# Patient Record
Sex: FEMALE | Race: WHITE | URBAN - METROPOLITAN AREA
[De-identification: names, ages, dates, MRNs, and addresses within clinical notes are randomized per-mention and may not be internally consistent; named-entity substitution may affect disease eponyms.]

---

## 2017-01-24 ENCOUNTER — OUTPATIENT (OUTPATIENT)
Dept: OUTPATIENT SERVICES | Facility: HOSPITAL | Age: 72
LOS: 1 days | Discharge: HOME | End: 2017-01-24

## 2017-06-27 DIAGNOSIS — D50.9 IRON DEFICIENCY ANEMIA, UNSPECIFIED: ICD-10-CM

## 2017-06-27 DIAGNOSIS — I10 ESSENTIAL (PRIMARY) HYPERTENSION: ICD-10-CM

## 2018-02-26 ENCOUNTER — OUTPATIENT (OUTPATIENT)
Dept: OUTPATIENT SERVICES | Facility: HOSPITAL | Age: 73
LOS: 1 days | Discharge: HOME | End: 2018-02-26

## 2018-02-26 VITALS
HEIGHT: 59 IN | OXYGEN SATURATION: 97 % | HEART RATE: 94 BPM | DIASTOLIC BLOOD PRESSURE: 81 MMHG | WEIGHT: 175.05 LBS | SYSTOLIC BLOOD PRESSURE: 138 MMHG | TEMPERATURE: 97 F | RESPIRATION RATE: 20 BRPM

## 2018-02-26 DIAGNOSIS — Z01.818 ENCOUNTER FOR OTHER PREPROCEDURAL EXAMINATION: ICD-10-CM

## 2018-02-26 DIAGNOSIS — Z98.890 OTHER SPECIFIED POSTPROCEDURAL STATES: Chronic | ICD-10-CM

## 2018-02-26 DIAGNOSIS — M17.12 UNILATERAL PRIMARY OSTEOARTHRITIS, LEFT KNEE: ICD-10-CM

## 2018-02-26 DIAGNOSIS — Z96.652 PRESENCE OF LEFT ARTIFICIAL KNEE JOINT: Chronic | ICD-10-CM

## 2018-02-26 LAB
ALBUMIN SERPL ELPH-MCNC: 4.4 G/DL — SIGNIFICANT CHANGE UP (ref 3–5.5)
ALP SERPL-CCNC: 78 U/L — SIGNIFICANT CHANGE UP (ref 30–115)
ALT FLD-CCNC: 21 U/L — SIGNIFICANT CHANGE UP (ref 0–41)
ANION GAP SERPL CALC-SCNC: 7 MMOL/L — SIGNIFICANT CHANGE UP (ref 7–14)
APPEARANCE UR: CLEAR — SIGNIFICANT CHANGE UP
APTT BLD: 31.1 SEC — SIGNIFICANT CHANGE UP (ref 27–39.2)
AST SERPL-CCNC: 27 U/L — SIGNIFICANT CHANGE UP (ref 0–41)
BASOPHILS # BLD AUTO: 0.07 K/UL — SIGNIFICANT CHANGE UP (ref 0–0.2)
BASOPHILS NFR BLD AUTO: 0.8 % — SIGNIFICANT CHANGE UP (ref 0–1)
BILIRUB SERPL-MCNC: 1.2 MG/DL — SIGNIFICANT CHANGE UP (ref 0.2–1.2)
BILIRUB UR-MCNC: NEGATIVE — SIGNIFICANT CHANGE UP
BLD GP AB SCN SERPL QL: SIGNIFICANT CHANGE UP
BUN SERPL-MCNC: 26 MG/DL — HIGH (ref 10–20)
CALCIUM SERPL-MCNC: 10 MG/DL — SIGNIFICANT CHANGE UP (ref 8.5–10.1)
CHLORIDE SERPL-SCNC: 106 MMOL/L — SIGNIFICANT CHANGE UP (ref 98–110)
CO2 SERPL-SCNC: 29 MMOL/L — SIGNIFICANT CHANGE UP (ref 17–32)
COLOR SPEC: YELLOW — SIGNIFICANT CHANGE UP
CREAT SERPL-MCNC: 1 MG/DL — SIGNIFICANT CHANGE UP (ref 0.7–1.5)
DIFF PNL FLD: NEGATIVE — SIGNIFICANT CHANGE UP
EOSINOPHIL # BLD AUTO: 0.07 K/UL — SIGNIFICANT CHANGE UP (ref 0–0.7)
EOSINOPHIL NFR BLD AUTO: 0.8 % — SIGNIFICANT CHANGE UP (ref 0–8)
GLUCOSE SERPL-MCNC: 88 MG/DL — SIGNIFICANT CHANGE UP (ref 70–110)
GLUCOSE UR QL: NEGATIVE MG/DL — SIGNIFICANT CHANGE UP
HCT VFR BLD CALC: 42.1 % — SIGNIFICANT CHANGE UP (ref 37–47)
HGB BLD-MCNC: 14.4 G/DL — SIGNIFICANT CHANGE UP (ref 14–18)
IMM GRANULOCYTES NFR BLD AUTO: 0.5 % — HIGH (ref 0.1–0.3)
INR BLD: 1.04 RATIO — SIGNIFICANT CHANGE UP (ref 0.65–1.3)
KETONES UR-MCNC: NEGATIVE — SIGNIFICANT CHANGE UP
LEUKOCYTE ESTERASE UR-ACNC: NEGATIVE — SIGNIFICANT CHANGE UP
LYMPHOCYTES # BLD AUTO: 2.24 K/UL — SIGNIFICANT CHANGE UP (ref 1.2–3.4)
LYMPHOCYTES # BLD AUTO: 27.1 % — SIGNIFICANT CHANGE UP (ref 20.5–51.1)
MCHC RBC-ENTMCNC: 29.7 PG — SIGNIFICANT CHANGE UP (ref 27–31)
MCHC RBC-ENTMCNC: 34.2 G/DL — SIGNIFICANT CHANGE UP (ref 32–37)
MCV RBC AUTO: 86.8 FL — SIGNIFICANT CHANGE UP (ref 81–91)
MONOCYTES # BLD AUTO: 0.55 K/UL — SIGNIFICANT CHANGE UP (ref 0.1–0.6)
MONOCYTES NFR BLD AUTO: 6.7 % — SIGNIFICANT CHANGE UP (ref 1.7–9.3)
NEUTROPHILS # BLD AUTO: 5.29 K/UL — SIGNIFICANT CHANGE UP (ref 1.4–6.5)
NEUTROPHILS NFR BLD AUTO: 64.1 % — SIGNIFICANT CHANGE UP (ref 42.2–75.2)
NITRITE UR-MCNC: NEGATIVE — SIGNIFICANT CHANGE UP
NRBC # BLD: 0 /100 WBCS — SIGNIFICANT CHANGE UP (ref 0–0)
PH UR: 5.5 — SIGNIFICANT CHANGE UP (ref 5–8)
PLATELET # BLD AUTO: 192 K/UL — SIGNIFICANT CHANGE UP (ref 130–400)
POTASSIUM SERPL-MCNC: 3.7 MMOL/L — SIGNIFICANT CHANGE UP (ref 3.5–5)
POTASSIUM SERPL-SCNC: 3.7 MMOL/L — SIGNIFICANT CHANGE UP (ref 3.5–5)
PROT SERPL-MCNC: 6.7 G/DL — SIGNIFICANT CHANGE UP (ref 6–8)
PROT UR-MCNC: NEGATIVE MG/DL — SIGNIFICANT CHANGE UP
PROTHROM AB SERPL-ACNC: 11.2 SEC — SIGNIFICANT CHANGE UP (ref 9.95–12.87)
RBC # BLD: 4.85 M/UL — SIGNIFICANT CHANGE UP (ref 4.2–5.4)
RBC # FLD: 12.2 % — SIGNIFICANT CHANGE UP (ref 11.5–14.5)
SODIUM SERPL-SCNC: 142 MMOL/L — SIGNIFICANT CHANGE UP (ref 135–146)
SP GR SPEC: 1.02 — SIGNIFICANT CHANGE UP (ref 1.01–1.03)
TYPE + AB SCN PNL BLD: SIGNIFICANT CHANGE UP
UROBILINOGEN FLD QL: 0.2 MG/DL — SIGNIFICANT CHANGE UP (ref 0.2–0.2)
WBC # BLD: 8.26 K/UL — SIGNIFICANT CHANGE UP (ref 4.8–10.8)
WBC # FLD AUTO: 8.26 K/UL — SIGNIFICANT CHANGE UP (ref 4.8–10.8)

## 2018-02-26 NOTE — H&P PST ADULT - PMH
HTN (hypertension)    Hypercholesteremia Arthritis  oa  HTN (hypertension)    Hypercholesteremia Arthritis  oa  Cancer, colon    HTN (hypertension)    Hypercholesteremia

## 2018-02-26 NOTE — H&P PST ADULT - GASTROINTESTINAL DETAILS
no masses palpable/bowel sounds normal/no distention/soft/obese with well healed  abd scars/normal/no rebound tenderness

## 2018-02-26 NOTE — H&P PST ADULT - REASON FOR ADMISSION
pt for right total knee replacement  pt with degenerative changes  pt denies current cp,sob,palp,cough,dysuria   cannot assess ex daxa. pt ambulates slowly and carefully with a cane  pt states this is complete med/surg history

## 2018-02-27 LAB — MRSA PCR RESULT.: NEGATIVE — SIGNIFICANT CHANGE UP

## 2018-02-28 LAB
CULTURE RESULTS: NO GROWTH — SIGNIFICANT CHANGE UP
SPECIMEN SOURCE: SIGNIFICANT CHANGE UP

## 2018-03-12 ENCOUNTER — RESULT REVIEW (OUTPATIENT)
Age: 73
End: 2018-03-12

## 2018-03-12 ENCOUNTER — INPATIENT (INPATIENT)
Facility: HOSPITAL | Age: 73
LOS: 1 days | Discharge: HOME HEALTH | End: 2018-03-14
Attending: ORTHOPAEDIC SURGERY

## 2018-03-12 VITALS
SYSTOLIC BLOOD PRESSURE: 122 MMHG | HEIGHT: 59 IN | HEART RATE: 72 BPM | TEMPERATURE: 98 F | DIASTOLIC BLOOD PRESSURE: 87 MMHG | RESPIRATION RATE: 18 BRPM | WEIGHT: 175.05 LBS

## 2018-03-12 DIAGNOSIS — Z96.652 PRESENCE OF LEFT ARTIFICIAL KNEE JOINT: Chronic | ICD-10-CM

## 2018-03-12 DIAGNOSIS — Z98.890 OTHER SPECIFIED POSTPROCEDURAL STATES: Chronic | ICD-10-CM

## 2018-03-12 LAB — ABO RH CONFIRMATION: SIGNIFICANT CHANGE UP

## 2018-03-12 RX ORDER — METOCLOPRAMIDE HCL 10 MG
10 TABLET ORAL EVERY 6 HOURS
Qty: 0 | Refills: 0 | Status: DISCONTINUED | OUTPATIENT
Start: 2018-03-12 | End: 2018-03-14

## 2018-03-12 RX ORDER — ENOXAPARIN SODIUM 100 MG/ML
40 INJECTION SUBCUTANEOUS ONCE
Qty: 0 | Refills: 0 | Status: COMPLETED | OUTPATIENT
Start: 2018-03-13 | End: 2018-03-13

## 2018-03-12 RX ORDER — ATORVASTATIN CALCIUM 80 MG/1
20 TABLET, FILM COATED ORAL AT BEDTIME
Qty: 0 | Refills: 0 | Status: DISCONTINUED | OUTPATIENT
Start: 2018-03-12 | End: 2018-03-14

## 2018-03-12 RX ORDER — ACETAMINOPHEN 500 MG
650 TABLET ORAL EVERY 6 HOURS
Qty: 0 | Refills: 0 | Status: DISCONTINUED | OUTPATIENT
Start: 2018-03-12 | End: 2018-03-14

## 2018-03-12 RX ORDER — HYDROCHLOROTHIAZIDE 25 MG
12.5 TABLET ORAL DAILY
Qty: 0 | Refills: 0 | Status: DISCONTINUED | OUTPATIENT
Start: 2018-03-13 | End: 2018-03-14

## 2018-03-12 RX ORDER — FERROUS SULFATE 325(65) MG
325 TABLET ORAL
Qty: 0 | Refills: 0 | Status: DISCONTINUED | OUTPATIENT
Start: 2018-03-12 | End: 2018-03-14

## 2018-03-12 RX ORDER — ASPIRIN/CALCIUM CARB/MAGNESIUM 324 MG
325 TABLET ORAL EVERY 12 HOURS
Qty: 0 | Refills: 0 | Status: DISCONTINUED | OUTPATIENT
Start: 2018-03-12 | End: 2018-03-14

## 2018-03-12 RX ORDER — OXYCODONE HYDROCHLORIDE 5 MG/1
5 TABLET ORAL EVERY 4 HOURS
Qty: 0 | Refills: 0 | Status: DISCONTINUED | OUTPATIENT
Start: 2018-03-12 | End: 2018-03-14

## 2018-03-12 RX ORDER — DOCUSATE SODIUM 100 MG
100 CAPSULE ORAL THREE TIMES A DAY
Qty: 0 | Refills: 0 | Status: DISCONTINUED | OUTPATIENT
Start: 2018-03-12 | End: 2018-03-14

## 2018-03-12 RX ORDER — SODIUM CHLORIDE 9 MG/ML
1000 INJECTION, SOLUTION INTRAVENOUS
Qty: 0 | Refills: 0 | Status: DISCONTINUED | OUTPATIENT
Start: 2018-03-12 | End: 2018-03-12

## 2018-03-12 RX ORDER — MORPHINE SULFATE 50 MG/1
4 CAPSULE, EXTENDED RELEASE ORAL
Qty: 0 | Refills: 0 | Status: DISCONTINUED | OUTPATIENT
Start: 2018-03-12 | End: 2018-03-12

## 2018-03-12 RX ORDER — SENNA PLUS 8.6 MG/1
1 TABLET ORAL
Qty: 0 | Refills: 0 | Status: DISCONTINUED | OUTPATIENT
Start: 2018-03-12 | End: 2018-03-14

## 2018-03-12 RX ORDER — HYDROMORPHONE HYDROCHLORIDE 2 MG/ML
1 INJECTION INTRAMUSCULAR; INTRAVENOUS; SUBCUTANEOUS
Qty: 0 | Refills: 0 | Status: DISCONTINUED | OUTPATIENT
Start: 2018-03-12 | End: 2018-03-12

## 2018-03-12 RX ORDER — OXYCODONE HYDROCHLORIDE 5 MG/1
10 TABLET ORAL EVERY 4 HOURS
Qty: 0 | Refills: 0 | Status: DISCONTINUED | OUTPATIENT
Start: 2018-03-12 | End: 2018-03-14

## 2018-03-12 RX ORDER — SODIUM CHLORIDE 9 MG/ML
1000 INJECTION INTRAMUSCULAR; INTRAVENOUS; SUBCUTANEOUS
Qty: 0 | Refills: 0 | Status: DISCONTINUED | OUTPATIENT
Start: 2018-03-12 | End: 2018-03-13

## 2018-03-12 RX ORDER — VALSARTAN 80 MG/1
80 TABLET ORAL DAILY
Qty: 0 | Refills: 0 | Status: DISCONTINUED | OUTPATIENT
Start: 2018-03-13 | End: 2018-03-14

## 2018-03-12 RX ADMIN — ATORVASTATIN CALCIUM 20 MILLIGRAM(S): 80 TABLET, FILM COATED ORAL at 21:27

## 2018-03-12 RX ADMIN — SENNA PLUS 1 TABLET(S): 8.6 TABLET ORAL at 17:47

## 2018-03-12 RX ADMIN — MORPHINE SULFATE 4 MILLIGRAM(S): 50 CAPSULE, EXTENDED RELEASE ORAL at 13:49

## 2018-03-12 RX ADMIN — MORPHINE SULFATE 4 MILLIGRAM(S): 50 CAPSULE, EXTENDED RELEASE ORAL at 14:49

## 2018-03-12 RX ADMIN — MORPHINE SULFATE 4 MILLIGRAM(S): 50 CAPSULE, EXTENDED RELEASE ORAL at 14:00

## 2018-03-12 RX ADMIN — Medication 650 MILLIGRAM(S): at 17:47

## 2018-03-12 RX ADMIN — MORPHINE SULFATE 4 MILLIGRAM(S): 50 CAPSULE, EXTENDED RELEASE ORAL at 15:00

## 2018-03-12 RX ADMIN — Medication 325 MILLIGRAM(S): at 17:47

## 2018-03-12 RX ADMIN — Medication 100 MILLIGRAM(S): at 19:08

## 2018-03-12 RX ADMIN — MORPHINE SULFATE 4 MILLIGRAM(S): 50 CAPSULE, EXTENDED RELEASE ORAL at 14:45

## 2018-03-12 RX ADMIN — SODIUM CHLORIDE 50 MILLILITER(S): 9 INJECTION INTRAMUSCULAR; INTRAVENOUS; SUBCUTANEOUS at 17:38

## 2018-03-12 RX ADMIN — Medication 650 MILLIGRAM(S): at 17:46

## 2018-03-12 RX ADMIN — MORPHINE SULFATE 4 MILLIGRAM(S): 50 CAPSULE, EXTENDED RELEASE ORAL at 15:41

## 2018-03-12 RX ADMIN — MORPHINE SULFATE 4 MILLIGRAM(S): 50 CAPSULE, EXTENDED RELEASE ORAL at 16:02

## 2018-03-12 RX ADMIN — Medication 650 MILLIGRAM(S): at 23:55

## 2018-03-12 RX ADMIN — Medication 100 MILLIGRAM(S): at 21:27

## 2018-03-12 NOTE — PRE-ANESTHESIA EVALUATION ADULT - NSANTHOSAYNRD_GEN_A_CORE
see manoj tool/No. MANOJ screening performed.  STOP BANG Legend: 0-2 = LOW Risk; 3-4 = INTERMEDIATE Risk; 5-8 = HIGH Risk

## 2018-03-12 NOTE — ASU PATIENT PROFILE, ADULT - PSH
H/O umbilical hernia repair  2001  History of colon resection  2002  History of incisional hernia repair  2003  History of knee replacement, total, left  1-2017  History of ventral hernia repair  per pt my hernia became entangled in mesh  2-2017

## 2018-03-12 NOTE — PROGRESS NOTE ADULT - SUBJECTIVE AND OBJECTIVE BOX
TKA ORTHOPEDIC POST OP CHECK    T(C): 36 (03-12-18 @ 17:00), Max: 36.8 (03-12-18 @ 09:15)  HR: 62 (03-12-18 @ 17:00) (62 - 80)  BP: 115/57 (03-12-18 @ 17:00) (105/67 - 134/83)  RR: 19 (03-12-18 @ 17:00) (16 - 23)  SpO2: 97% (03-12-18 @ 16:07) (97% - 99%)      preop h/h/ 14/42      S/P TKA ARTHROPLASTY  PAIN CONTROLLED  VSS   A&O X3  DRESSING C/D/I  NVI  ANTIBIOTICS INFUSED  DVT PROPHYLAXIS ORDERED  ENCOURAGE INCENTIVE SPIROMETRY  AM LABS  REHAB TO BEGIN IN THE AM WBAT  D/C PLANNING

## 2018-03-12 NOTE — BRIEF OPERATIVE NOTE - PROCEDURE
<<-----Click on this checkbox to enter Procedure Right total knee replacement  03/12/2018    Active  ACE

## 2018-03-12 NOTE — BRIEF OPERATIVE NOTE - OPERATION/FINDINGS
degenerative changes, preoperative flexion contracture 20 degrees, knee flexion only to 60 degress, full motion post op

## 2018-03-13 ENCOUNTER — TRANSCRIPTION ENCOUNTER (OUTPATIENT)
Age: 73
End: 2018-03-13

## 2018-03-13 LAB
ANION GAP SERPL CALC-SCNC: 7 MMOL/L — SIGNIFICANT CHANGE UP (ref 7–14)
BASOPHILS # BLD AUTO: 0.02 K/UL — SIGNIFICANT CHANGE UP (ref 0–0.2)
BASOPHILS NFR BLD AUTO: 0.2 % — SIGNIFICANT CHANGE UP (ref 0–1)
BUN SERPL-MCNC: 32 MG/DL — HIGH (ref 10–20)
CALCIUM SERPL-MCNC: 8.6 MG/DL — SIGNIFICANT CHANGE UP (ref 8.5–10.1)
CHLORIDE SERPL-SCNC: 99 MMOL/L — SIGNIFICANT CHANGE UP (ref 98–110)
CO2 SERPL-SCNC: 27 MMOL/L — SIGNIFICANT CHANGE UP (ref 17–32)
CREAT SERPL-MCNC: 1 MG/DL — SIGNIFICANT CHANGE UP (ref 0.7–1.5)
EOSINOPHIL # BLD AUTO: 0 K/UL — SIGNIFICANT CHANGE UP (ref 0–0.7)
EOSINOPHIL NFR BLD AUTO: 0 % — SIGNIFICANT CHANGE UP (ref 0–8)
GLUCOSE SERPL-MCNC: 115 MG/DL — HIGH (ref 70–110)
HCT VFR BLD CALC: 35 % — LOW (ref 37–47)
HGB BLD-MCNC: 11.8 G/DL — LOW (ref 12–16)
IMM GRANULOCYTES NFR BLD AUTO: 0.5 % — HIGH (ref 0.1–0.3)
LYMPHOCYTES # BLD AUTO: 1.29 K/UL — SIGNIFICANT CHANGE UP (ref 1.2–3.4)
LYMPHOCYTES # BLD AUTO: 14.6 % — LOW (ref 20.5–51.1)
MCHC RBC-ENTMCNC: 29.8 PG — SIGNIFICANT CHANGE UP (ref 27–31)
MCHC RBC-ENTMCNC: 33.7 G/DL — SIGNIFICANT CHANGE UP (ref 32–37)
MCV RBC AUTO: 88.4 FL — SIGNIFICANT CHANGE UP (ref 81–99)
MONOCYTES # BLD AUTO: 0.97 K/UL — HIGH (ref 0.1–0.6)
MONOCYTES NFR BLD AUTO: 11 % — HIGH (ref 1.7–9.3)
NEUTROPHILS # BLD AUTO: 6.5 K/UL — SIGNIFICANT CHANGE UP (ref 1.4–6.5)
NEUTROPHILS NFR BLD AUTO: 73.7 % — SIGNIFICANT CHANGE UP (ref 42.2–75.2)
NRBC # BLD: 0 /100 WBCS — SIGNIFICANT CHANGE UP (ref 0–0)
PLATELET # BLD AUTO: 152 K/UL — SIGNIFICANT CHANGE UP (ref 130–400)
POTASSIUM SERPL-MCNC: 4.4 MMOL/L — SIGNIFICANT CHANGE UP (ref 3.5–5)
POTASSIUM SERPL-SCNC: 4.4 MMOL/L — SIGNIFICANT CHANGE UP (ref 3.5–5)
RBC # BLD: 3.96 M/UL — LOW (ref 4.2–5.4)
RBC # FLD: 12.4 % — SIGNIFICANT CHANGE UP (ref 11.5–14.5)
SODIUM SERPL-SCNC: 133 MMOL/L — LOW (ref 135–146)
WBC # BLD: 8.82 K/UL — SIGNIFICANT CHANGE UP (ref 4.8–10.8)
WBC # FLD AUTO: 8.82 K/UL — SIGNIFICANT CHANGE UP (ref 4.8–10.8)

## 2018-03-13 RX ADMIN — Medication 1 TABLET(S): at 12:09

## 2018-03-13 RX ADMIN — OXYCODONE HYDROCHLORIDE 5 MILLIGRAM(S): 5 TABLET ORAL at 23:39

## 2018-03-13 RX ADMIN — Medication 100 MILLIGRAM(S): at 13:11

## 2018-03-13 RX ADMIN — VALSARTAN 80 MILLIGRAM(S): 80 TABLET ORAL at 05:51

## 2018-03-13 RX ADMIN — OXYCODONE HYDROCHLORIDE 5 MILLIGRAM(S): 5 TABLET ORAL at 17:29

## 2018-03-13 RX ADMIN — ATORVASTATIN CALCIUM 20 MILLIGRAM(S): 80 TABLET, FILM COATED ORAL at 21:58

## 2018-03-13 RX ADMIN — Medication 325 MILLIGRAM(S): at 05:17

## 2018-03-13 RX ADMIN — Medication 325 MILLIGRAM(S): at 07:57

## 2018-03-13 RX ADMIN — ENOXAPARIN SODIUM 40 MILLIGRAM(S): 100 INJECTION SUBCUTANEOUS at 05:29

## 2018-03-13 RX ADMIN — Medication 325 MILLIGRAM(S): at 12:09

## 2018-03-13 RX ADMIN — Medication 650 MILLIGRAM(S): at 12:11

## 2018-03-13 RX ADMIN — Medication 650 MILLIGRAM(S): at 12:10

## 2018-03-13 RX ADMIN — Medication 650 MILLIGRAM(S): at 17:29

## 2018-03-13 RX ADMIN — Medication 100 MILLIGRAM(S): at 21:58

## 2018-03-13 RX ADMIN — Medication 650 MILLIGRAM(S): at 17:30

## 2018-03-13 RX ADMIN — SENNA PLUS 1 TABLET(S): 8.6 TABLET ORAL at 17:28

## 2018-03-13 RX ADMIN — Medication 650 MILLIGRAM(S): at 05:14

## 2018-03-13 RX ADMIN — Medication 650 MILLIGRAM(S): at 06:47

## 2018-03-13 RX ADMIN — Medication 650 MILLIGRAM(S): at 23:40

## 2018-03-13 RX ADMIN — Medication 100 MILLIGRAM(S): at 02:58

## 2018-03-13 RX ADMIN — Medication 12.5 MILLIGRAM(S): at 05:17

## 2018-03-13 RX ADMIN — OXYCODONE HYDROCHLORIDE 5 MILLIGRAM(S): 5 TABLET ORAL at 17:30

## 2018-03-13 RX ADMIN — Medication 100 MILLIGRAM(S): at 05:18

## 2018-03-13 RX ADMIN — Medication 325 MILLIGRAM(S): at 17:29

## 2018-03-13 RX ADMIN — SENNA PLUS 1 TABLET(S): 8.6 TABLET ORAL at 05:15

## 2018-03-13 NOTE — DISCHARGE NOTE ADULT - PATIENT PORTAL LINK FT
You can access the [facility] Patient Portal, offered by [facility], by registering with the following website: [email or web address]

## 2018-03-13 NOTE — DISCHARGE NOTE ADULT - CARE PROVIDER_API CALL
Valentino Jensen), Orthopaedic Surgery  [facility] [place] [address]  Phone: [phone]  Fax: [phone]

## 2018-03-13 NOTE — DISCHARGE NOTE ADULT - CARE PLAN
Principal Discharge DX:	Arthritis  Goal:	return to  function following knee replacement  Assessment and plan of treatment:	physical therapy s/p Total knee replacement

## 2018-03-13 NOTE — CHART NOTE - NSCHARTNOTEFT_GEN_A_CORE
patient seen and evaluated for post anesthesia follow up  No anesthesia related  complications or events  vitals stable, recovering well

## 2018-03-13 NOTE — PROVIDER CONTACT NOTE (CHANGE IN STATUS NOTIFICATION) - SITUATION
ELIZABETH Burroughs notified of pt. complaining of pain radiating from left side to back. Pt. does not want pain medicine.

## 2018-03-13 NOTE — DISCHARGE NOTE ADULT - MEDICATION SUMMARY - MEDICATIONS TO TAKE
I will START or STAY ON the medications listed below when I get home from the hospital:    aspirin 325 mg oral tablet  -- 1 tab(s) by mouth every 12 hours FOR DVT PROPHYLAXIS   -- Indication: For dvt prophylaxis    oxyCODONE 5 mg oral tablet  -- 1 tab(s) by mouth every 4 hours, As needed, Mild Pain MDD:6  -- Indication: For pain    atorvastatin 20 mg oral tablet  -- 1 tab(s) by mouth once a day (at bedtime)  -- Indication: For home med    Diovan HCT 80 mg-12.5 mg oral tablet  -- 1 tab(s) by mouth once a day  -- Indication: For home med

## 2018-03-13 NOTE — PROGRESS NOTE ADULT - SUBJECTIVE AND OBJECTIVE BOX
pt se this am, pain controlled, denies fc nv dc cp sob calf pain    aox3 nad  r knee  cdi  silt  nvid  digits well perfused    sp r tka  - pain control  - dc drain tomorrow  - out of bed to chair  - ambulation  - pt  - wbat  - dc planning  - dvt ppx

## 2018-03-13 NOTE — CONSULT NOTE ADULT - ASSESSMENT
IMPRESSION: Rehab of R TKR    PRECAUTIONS: [    ] Cardiac  [    ] Respiratory  [    ] Seizures [    ] Contact Isolation  [    ] Droplet Isolation  [    ] Other    Weight Bearing Status:     RECOMMENDATION:    Out of Bed to Chair     DVT/Decubiti Prophylaxis    REHAB PLAN:     [   x  ] Bedside P/T 3-5 times a week   [ x    ] Bedside O/T  2-3 times a week   [     ] No Rehab Therapy Indicated   [     ]  Speech Therapy   Conditioning/ROM                                 ADL  Bed Mobility                                            Conditioning/ROM  Transfers                                                  Bed Mobility  Sitting /Standing Balance                      Transfers                                        Gait Training                                            Sitting/Standing Balance  Stair Training [x   ]Applicable                 Home equipment Eval                                                                     Splinting  [   ] Only      GOALS:   ADL   [  x  ]   Independent         Transfers  [   x ] Independent            Ambulation  [   x  ] Independent     [   x  ] With device                            [    ]  CG                                               [    ]  CG                                                    [     ] CG                            [    ] Min A                                          [    ] Min A                                                [     ] Min  A          DISCHARGE PLAN:   [     ]  Good candidate for Intensive Rehabilitation/Hospital based-4A [facility]                                             Will tolerate 3hrs Intensive Rehab Daily                                       [      ]  Short Term Rehab in Skilled Nursing Facility                                       [ x     ]  Home with Outpatient or  services                                         [      ]  Possible Candidate for Intensive Hospital based Rehab

## 2018-03-13 NOTE — CONSULT NOTE ADULT - SUBJECTIVE AND OBJECTIVE BOX
Patient is a 72y old  Female who presents with a chief complaint of Pain R Knee   HPI: SP R TKR      PAST MEDICAL & SURGICAL HISTORY:  Cancer, colon  Arthritis: oa  Hypercholesteremia  HTN (hypertension)  History of incisional hernia repair: 2003  H/O umbilical hernia repair: 2001  History of colon resection: 2002  History of knee replacement, total, left: 1-2017  History of ventral hernia repair: per pt my hernia became entangled in mesh  2-2017      Hospital Course: UNcomplicated    TODAY'S SUBJECTIVE & REVIEW OF SYMPTOMS:     Constitutional pain   Cardio WNL   Resp WNL   GI WNL  Heme WNL  Endo WNL  Skin WNL  MSK hx LBP  Neuro WNL  Cognitive WNL  Psych WNL      MEDICATIONS  (STANDING):  acetaminophen   Tablet. 650 milliGRAM(s) Oral every 6 hours  aspirin 325 milliGRAM(s) Oral every 12 hours  atorvastatin 20 milliGRAM(s) Oral at bedtime  docusate sodium 100 milliGRAM(s) Oral three times a day  ferrous    sulfate 325 milliGRAM(s) Oral three times a day with meals  hydrochlorothiazide 12.5 milliGRAM(s) Oral daily  multivitamin 1 Tablet(s) Oral daily  senna 1 Tablet(s) Oral two times a day  valsartan 80 milliGRAM(s) Oral daily    MEDICATIONS  (PRN):  metoclopramide Injectable 10 milliGRAM(s) IV Push every 6 hours PRN Nausea and/or Vomiting  oxyCODONE    IR 10 milliGRAM(s) Oral every 4 hours PRN Moderate Pain  oxyCODONE    IR 5 milliGRAM(s) Oral every 4 hours PRN Mild Pain      FAMILY HISTORY:      Allergies    penicillin (Other)    Intolerances        SOCIAL HISTORY:    [    ] Etoh  [    ] Smoking  [    ] Substance abuse     Home Environment:  [    ] Home Alone  [  x  ] Lives with Family  [    ] Home Health Aid    Dwelling:  [    ] Apartment  [ x   ] Private House  [    ] Adult Home  [    ] Skilled Nursing Facility      [    ] Short Term  [    ] Long Term  [  x  ] Stairs                           [    ] Elevator     FUNCTIONAL STATUS PTA: (Check all that apply)  Ambulation: [  x   ]Independent    [    ] Dependent     [    ] Non-Ambulatory  Assistive Device: [  x  ] SA Cane  [    ]  Q Cane  [    ] Walker  [    ]  Wheelchair  ADL : [  x  ] Independent  [    ]  Dependent       Vital Signs Last 24 Hrs  T(C): 36.4 (13 Mar 2018 07:49), Max: 36.6 (12 Mar 2018 14:35)  T(F): 97.5 (13 Mar 2018 07:49), Max: 97.9 (12 Mar 2018 14:35)  HR: 62 (13 Mar 2018 07:49) (54 - 80)  BP: 103/58 (13 Mar 2018 07:49) (100/59 - 134/83)  BP(mean): --  RR: 18 (13 Mar 2018 07:49) (16 - 23)  SpO2: 97% (12 Mar 2018 16:07) (97% - 99%)      PHYSICAL EXAM: Alert & Oriented X3  GENERAL: NAD, well-groomed, well-developed  HEAD:  Atraumatic, Normocephalic  EYES: EOMI, PERRLA, conjunctiva and sclera clear  NECK: Supple, No JVD, Normal thyroid  CHEST/LUNG: Clear to percussion bilaterally; No rales, rhonchi, wheezing, or rubs  HEART: Regular rate and rhythm; No murmurs, rubs, or gallops  ABDOMEN: Soft, Nontender, Nondistended; Bowel sounds present  EXTREMITIES:  2+ Peripheral Pulses, No clubbing, cyanosis, or edema R knne bandaged with drain      NERVOUS SYSTEM:  Cranial Nerves 2-12 intact [ x   ] Abnormal  [    ]  ROM: WFL all extremities except R knee  Motor Strength: WFL all extremities  [ x   ]  Abnormal [    ] +SLR RLE Distally 5/5  Sensation: intact to light touch [  x  ] Abnormal [    ]  Reflexes: Symmetric [    ]  Abnormal [    ]    FUNCTIONAL STATUS:  Bed Mobility: [   ]  Independent [    ]  Supervision [ x   ]  Needs Assistance [  ]  N/A  Transfers: [    ]  Independent [    ]  Supervision [x  ]  Needs Assistance [    ]  N/A   CG with transfers, RW  with PT  Ambulation:  [    ]  Independent [    ]  Supervision [ x   ]  Needs Assistance [    ]  N/A   ADL:  [    ]   Independent [x    ] Requires Assistance [    ] N/A       LABS:                        11.8   8.82  )-----------( 152      ( 13 Mar 2018 08:02 )             35.0     03-13    133<L>  |  99  |  32<H>  ----------------------------<  115<H>  4.4   |  27  |  1.0    Ca    8.6      13 Mar 2018 08:02            RADIOLOGY & ADDITIONAL STUDIES:

## 2018-03-13 NOTE — PROGRESS NOTE ADULT - SUBJECTIVE AND OBJECTIVE BOX
pt s&e  POD  doing well  dressing c/d  leg soft  nvid    s/p TKA pod 1    plan  f/u am labs  pt  dvt proph  IS  OOB

## 2018-03-14 VITALS
HEART RATE: 79 BPM | SYSTOLIC BLOOD PRESSURE: 132 MMHG | TEMPERATURE: 99 F | DIASTOLIC BLOOD PRESSURE: 72 MMHG | RESPIRATION RATE: 18 BRPM

## 2018-03-14 LAB
HCT VFR BLD CALC: 33.1 % — LOW (ref 37–47)
HGB BLD-MCNC: 11.2 G/DL — LOW (ref 12–16)
MCHC RBC-ENTMCNC: 29.9 PG — SIGNIFICANT CHANGE UP (ref 27–31)
MCHC RBC-ENTMCNC: 33.8 G/DL — SIGNIFICANT CHANGE UP (ref 32–37)
MCV RBC AUTO: 88.5 FL — SIGNIFICANT CHANGE UP (ref 81–99)
NRBC # BLD: 0 /100 WBCS — SIGNIFICANT CHANGE UP (ref 0–0)
PLATELET # BLD AUTO: 128 K/UL — LOW (ref 130–400)
RBC # BLD: 3.74 M/UL — LOW (ref 4.2–5.4)
RBC # FLD: 12.7 % — SIGNIFICANT CHANGE UP (ref 11.5–14.5)
WBC # BLD: 7.94 K/UL — SIGNIFICANT CHANGE UP (ref 4.8–10.8)
WBC # FLD AUTO: 7.94 K/UL — SIGNIFICANT CHANGE UP (ref 4.8–10.8)

## 2018-03-14 RX ORDER — OMEGA-3 ACID ETHYL ESTERS 1 G
0 CAPSULE ORAL
Qty: 0 | Refills: 0 | COMMUNITY

## 2018-03-14 RX ORDER — OXYCODONE HYDROCHLORIDE 5 MG/1
1 TABLET ORAL
Qty: 42 | Refills: 0
Start: 2018-03-14 | End: 2018-03-20

## 2018-03-14 RX ORDER — ASPIRIN/CALCIUM CARB/MAGNESIUM 324 MG
1 TABLET ORAL
Qty: 60 | Refills: 0
Start: 2018-03-14 | End: 2018-04-12

## 2018-03-14 RX ADMIN — OXYCODONE HYDROCHLORIDE 5 MILLIGRAM(S): 5 TABLET ORAL at 05:07

## 2018-03-14 RX ADMIN — OXYCODONE HYDROCHLORIDE 5 MILLIGRAM(S): 5 TABLET ORAL at 01:05

## 2018-03-14 RX ADMIN — Medication 100 MILLIGRAM(S): at 05:02

## 2018-03-14 RX ADMIN — SENNA PLUS 1 TABLET(S): 8.6 TABLET ORAL at 05:03

## 2018-03-14 RX ADMIN — OXYCODONE HYDROCHLORIDE 5 MILLIGRAM(S): 5 TABLET ORAL at 13:03

## 2018-03-14 RX ADMIN — VALSARTAN 80 MILLIGRAM(S): 80 TABLET ORAL at 05:04

## 2018-03-14 RX ADMIN — OXYCODONE HYDROCHLORIDE 5 MILLIGRAM(S): 5 TABLET ORAL at 05:30

## 2018-03-14 RX ADMIN — Medication 325 MILLIGRAM(S): at 12:23

## 2018-03-14 RX ADMIN — Medication 12.5 MILLIGRAM(S): at 05:03

## 2018-03-14 RX ADMIN — OXYCODONE HYDROCHLORIDE 5 MILLIGRAM(S): 5 TABLET ORAL at 12:34

## 2018-03-14 RX ADMIN — Medication 650 MILLIGRAM(S): at 05:02

## 2018-03-14 RX ADMIN — Medication 100 MILLIGRAM(S): at 13:06

## 2018-03-14 RX ADMIN — Medication 1 TABLET(S): at 12:23

## 2018-03-14 RX ADMIN — Medication 325 MILLIGRAM(S): at 05:03

## 2018-03-14 RX ADMIN — Medication 325 MILLIGRAM(S): at 09:07

## 2018-03-14 NOTE — PROGRESS NOTE ADULT - SUBJECTIVE AND OBJECTIVE BOX
POD# 2 S/P TKA  T(C): 37.5 (03-13-18 @ 23:44), Max: 37.5 (03-13-18 @ 23:44)  HR: 72 (03-14-18 @ 05:00) (66 - 75)  BP: 125/69 (03-14-18 @ 05:00) (125/69 - 135/76)  RR: 18 (03-14-18 @ 05:00) (18 - 18)  SpO2: 95% (03-14-18 @ 05:00) (95% - 95%)                        11.2   7.94  )-----------( 128      ( 14 Mar 2018 08:02 )             33.1     03-13    133<L>  |  99  |  32<H>  ----------------------------<  115<H>  4.4   |  27  |  1.0    Ca    8.6      13 Mar 2018 08:02        PTS PAIN IS CONTROLLED   WOUND IS CDI DRESSING CHANGED HV REMOVED	  N/V/I  ABX COMPLETE  DVT PROPHYLAXIS IN PLACE  REHAB IN PROGRESS  D/C PLAN PENDING

## 2018-03-14 NOTE — OCCUPATIONAL THERAPY INITIAL EVALUATION ADULT - GENERAL OBSERVATIONS, REHAB EVAL
pt received semi alonzo in bed in NAD, agreeable to Ot evaluation, spouse present at bedside, + R knee ace wrap, left seated at EOB with PCA Mario present

## 2018-03-19 DIAGNOSIS — M17.12 UNILATERAL PRIMARY OSTEOARTHRITIS, LEFT KNEE: ICD-10-CM

## 2018-03-19 DIAGNOSIS — I10 ESSENTIAL (PRIMARY) HYPERTENSION: ICD-10-CM

## 2018-03-19 DIAGNOSIS — E78.00 PURE HYPERCHOLESTEROLEMIA, UNSPECIFIED: ICD-10-CM

## 2018-03-19 DIAGNOSIS — Z96.652 PRESENCE OF LEFT ARTIFICIAL KNEE JOINT: ICD-10-CM

## 2018-03-19 DIAGNOSIS — M17.11 UNILATERAL PRIMARY OSTEOARTHRITIS, RIGHT KNEE: ICD-10-CM

## 2018-03-19 DIAGNOSIS — Z85.038 PERSONAL HISTORY OF OTHER MALIGNANT NEOPLASM OF LARGE INTESTINE: ICD-10-CM

## 2018-03-23 LAB — SURGICAL PATHOLOGY STUDY: SIGNIFICANT CHANGE UP

## 2018-04-10 PROBLEM — I10 ESSENTIAL (PRIMARY) HYPERTENSION: Chronic | Status: ACTIVE | Noted: 2018-02-26

## 2018-04-10 PROBLEM — E78.00 PURE HYPERCHOLESTEROLEMIA, UNSPECIFIED: Chronic | Status: ACTIVE | Noted: 2018-02-26

## 2018-06-19 ENCOUNTER — OUTPATIENT (OUTPATIENT)
Dept: OUTPATIENT SERVICES | Facility: HOSPITAL | Age: 73
LOS: 1 days | Discharge: HOME | End: 2018-06-19

## 2018-06-19 DIAGNOSIS — Z96.631 PRESENCE OF RIGHT ARTIFICIAL WRIST JOINT: ICD-10-CM

## 2018-06-19 DIAGNOSIS — Z98.890 OTHER SPECIFIED POSTPROCEDURAL STATES: Chronic | ICD-10-CM

## 2018-06-19 DIAGNOSIS — Z96.652 PRESENCE OF LEFT ARTIFICIAL KNEE JOINT: Chronic | ICD-10-CM

## 2018-06-19 DIAGNOSIS — M15.0 PRIMARY GENERALIZED (OSTEO)ARTHRITIS: ICD-10-CM

## 2018-08-17 ENCOUNTER — RESULT REVIEW (OUTPATIENT)
Age: 73
End: 2018-08-17

## 2019-08-27 NOTE — ASU PREOP CHECKLIST - DNR CLARIFICATION FORM COMPLETED
Wound Care:   1  Incisional Hernia   WHAT YOU NEED TO KNOW:   An incisional hernia is a bulge through the healed incision of a previous surgery in your abdomen  An incisional hernia is usually caused by weakness in the tissues and muscles of your abdomen  The bulge is usually caused by a part of your intestine, but it may also be tissue or fat pushing through the weakness  DISCHARGE INSTRUCTIONS:   Call 911 for any of the following:   · You have sudden trouble breathing  Seek care immediately if:   · You have severe pain  · You have bloody bowel movements  · You stop having bowel movements or passing gas  · Your abdomen is suddenly very hard  Contact your healthcare provider if:   · You have a fever  · You have nausea and are vomiting  · You are constipated  · Your hernia has returned  · You have a lump, or collection of fluid, under your skin  · You have pain that does not go away, even after you take pain medicine  · You have questions or concerns about your condition or care  Medicines: You may need any of the following:  · NSAIDs , such as ibuprofen, help decrease swelling, pain, and fever  This medicine is available with or without a doctor's order  NSAIDs can cause stomach bleeding or kidney problems in certain people  If you take blood thinner medicine, always ask your healthcare provider if NSAIDs are safe for you  Always read the medicine label and follow directions  · Prescription pain medicine  may be given  Ask your how to take this medicine safely  · Take your medicine as directed  Contact your healthcare provider if you think your medicine is not helping or if you have side effects  Tell him or her if you are allergic to any medicine  Keep a list of the medicines, vitamins, and herbs you take  Include the amounts, and when and why you take them  Bring the list or the pill bottles to follow-up visits   Carry your medicine list with you in case of an emergency  Prevent another incisional hernia:   · Maintain a healthy weight  Ask your healthcare provider how much you should weigh  Ask him to help you create a weight loss plan if you are overweight  Ask your healthcare provider what types of food you should eat  · Do not strain  when you have a bowel movement  Take an over-the-counter bowel movement softener and drink plenty of water  When you cough, hold a pillow against your incision to prevent strain  · Do not smoke  If you smoke, it is never too late to quit  Ask for information if you need help quitting  · Wear your support device as directed  You may need to wear a support device, such as an abdominal binder for up to 2 weeks after surgery  This helps decrease pain and the risk of fluid collecting under your skin  · Return to your usual activities as directed  Do not lift more than 10 pounds or do strenuous activity for up to 6 weeks  Follow up with your healthcare provider as directed:  Write down your questions so you remember to ask them during your visits  © 2017 [address] [facility] Information is for End User's use only and may not be sold, redistributed or otherwise used for commercial purposes  All illustrations and images included in CareNotes® are the copyrighted property of A D A M , Inc  or "[employer]uss  The above information is an  only  It is not intended as medical advice for individual conditions or treatments  Talk to your doctor, nurse or pharmacist before following any medical regimen to see if it is safe and effective for you  -Hydraguard to bilateral sacrum, buttock and heels BID and PRN  2-Elevate heels to offload pressure  3-Ehob cushion in chair when out of bed  4-Moisturize skin daily with skin nourishing cream   5-Turn/reposition q2h or when medically stable for pressure re-distribution on skin    6-Cleanse R breast with soap and water, pat dry, apply calazime BID and PRN    n/a

## 2020-08-04 PROBLEM — C18.9 MALIGNANT NEOPLASM OF COLON, UNSPECIFIED: Chronic | Status: ACTIVE | Noted: 2018-02-26

## 2020-08-04 PROBLEM — M19.90 UNSPECIFIED OSTEOARTHRITIS, UNSPECIFIED SITE: Chronic | Status: ACTIVE | Noted: 2018-02-26

## 2020-08-11 PROBLEM — Z00.00 ENCOUNTER FOR PREVENTIVE HEALTH EXAMINATION: Status: ACTIVE | Noted: 2020-08-11

## 2020-08-26 ENCOUNTER — APPOINTMENT (OUTPATIENT)
Dept: OTOLARYNGOLOGY | Facility: CLINIC | Age: 75
End: 2020-08-26
Payer: MEDICARE

## 2020-08-26 VITALS — WEIGHT: 175 LBS | BODY MASS INDEX: 35.28 KG/M2 | HEIGHT: 59 IN

## 2020-08-26 DIAGNOSIS — E78.5 HYPERLIPIDEMIA, UNSPECIFIED: ICD-10-CM

## 2020-08-26 DIAGNOSIS — J31.0 CHRONIC RHINITIS: ICD-10-CM

## 2020-08-26 DIAGNOSIS — Z82.49 FAMILY HISTORY OF ISCHEMIC HEART DISEASE AND OTHER DISEASES OF THE CIRCULATORY SYSTEM: ICD-10-CM

## 2020-08-26 DIAGNOSIS — Z87.891 PERSONAL HISTORY OF NICOTINE DEPENDENCE: ICD-10-CM

## 2020-08-26 DIAGNOSIS — Z83.3 FAMILY HISTORY OF DIABETES MELLITUS: ICD-10-CM

## 2020-08-26 DIAGNOSIS — J35.8 OTHER CHRONIC DISEASES OF TONSILS AND ADENOIDS: ICD-10-CM

## 2020-08-26 DIAGNOSIS — R49.0 DYSPHONIA: ICD-10-CM

## 2020-08-26 DIAGNOSIS — Z78.9 OTHER SPECIFIED HEALTH STATUS: ICD-10-CM

## 2020-08-26 DIAGNOSIS — K21.9 GASTRO-ESOPHAGEAL REFLUX DISEASE W/OUT ESOPHAGITIS: ICD-10-CM

## 2020-08-26 DIAGNOSIS — I10 ESSENTIAL (PRIMARY) HYPERTENSION: ICD-10-CM

## 2020-08-26 PROCEDURE — 99204 OFFICE O/P NEW MOD 45 MIN: CPT | Mod: 25

## 2020-08-26 PROCEDURE — 31575 DIAGNOSTIC LARYNGOSCOPY: CPT

## 2020-08-26 RX ORDER — ATORVASTATIN CALCIUM 80 MG/1
TABLET, FILM COATED ORAL
Refills: 0 | Status: ACTIVE | COMMUNITY

## 2020-08-26 RX ORDER — OMEPRAZOLE 40 MG/1
40 CAPSULE, DELAYED RELEASE ORAL
Qty: 30 | Refills: 2 | Status: ACTIVE | COMMUNITY
Start: 2020-08-26 | End: 1900-01-01

## 2020-08-26 RX ORDER — IPRATROPIUM BROMIDE 21 UG/1
0.03 SPRAY NASAL 3 TIMES DAILY
Qty: 2 | Refills: 3 | Status: ACTIVE | COMMUNITY
Start: 2020-08-26 | End: 1900-01-01

## 2020-08-26 RX ORDER — HYDROCHLOROTHIAZIDE 12.5 MG/1
TABLET ORAL
Refills: 0 | Status: ACTIVE | COMMUNITY

## 2020-08-26 RX ORDER — LOSARTAN POTASSIUM 100 MG/1
TABLET, FILM COATED ORAL
Refills: 0 | Status: ACTIVE | COMMUNITY

## 2020-08-26 NOTE — HISTORY OF PRESENT ILLNESS
[de-identified] : Patient here today with c/o hoarseness. Patient admits it has been present for years. getting worse. gets worse sometimes. She feels her voice gets low at times. No dysphagia. No choking. H/o smoking quit 50 years ago. \par H/o reflux and PND, no meds now.\par She also c/o of a fb sensation in her throat and coughing up off white stones. \par \par ALso complains of bilateral nose dripping when she eats.

## 2020-09-02 ENCOUNTER — OUTPATIENT (OUTPATIENT)
Dept: OUTPATIENT SERVICES | Facility: HOSPITAL | Age: 75
LOS: 1 days | Discharge: HOME | End: 2020-09-02
Payer: MEDICARE

## 2020-09-02 DIAGNOSIS — Z98.890 OTHER SPECIFIED POSTPROCEDURAL STATES: Chronic | ICD-10-CM

## 2020-09-02 DIAGNOSIS — Z96.652 PRESENCE OF LEFT ARTIFICIAL KNEE JOINT: Chronic | ICD-10-CM

## 2020-09-02 DIAGNOSIS — K21.9 GASTRO-ESOPHAGEAL REFLUX DISEASE WITHOUT ESOPHAGITIS: ICD-10-CM

## 2020-09-02 PROCEDURE — 76536 US EXAM OF HEAD AND NECK: CPT | Mod: 26

## 2020-09-10 LAB
ANION GAP SERPL CALC-SCNC: 14 MMOL/L
BUN SERPL-MCNC: 18 MG/DL
CALCIUM SERPL-MCNC: 9.9 MG/DL
CHLORIDE SERPL-SCNC: 104 MMOL/L
CO2 SERPL-SCNC: 28 MMOL/L
CREAT SERPL-MCNC: 0.9 MG/DL
GLUCOSE SERPL-MCNC: 98 MG/DL
POTASSIUM SERPL-SCNC: 4.5 MMOL/L
SODIUM SERPL-SCNC: 146 MMOL/L

## 2020-09-30 ENCOUNTER — APPOINTMENT (OUTPATIENT)
Dept: OTOLARYNGOLOGY | Facility: CLINIC | Age: 75
End: 2020-09-30

## 2020-10-16 ENCOUNTER — RESULT REVIEW (OUTPATIENT)
Age: 75
End: 2020-10-16

## 2020-10-26 ENCOUNTER — APPOINTMENT (OUTPATIENT)
Dept: OTOLARYNGOLOGY | Facility: CLINIC | Age: 75
End: 2020-10-26

## 2020-11-02 ENCOUNTER — APPOINTMENT (OUTPATIENT)
Dept: OTOLARYNGOLOGY | Facility: CLINIC | Age: 75
End: 2020-11-02
Payer: MEDICARE

## 2020-11-02 DIAGNOSIS — C44.90 UNSPECIFIED MALIGNANT NEOPLASM OF SKIN, UNSPECIFIED: ICD-10-CM

## 2020-11-02 DIAGNOSIS — R07.0 PAIN IN THROAT: ICD-10-CM

## 2020-11-02 PROCEDURE — 99214 OFFICE O/P EST MOD 30 MIN: CPT | Mod: 25

## 2020-11-02 PROCEDURE — 31575 DIAGNOSTIC LARYNGOSCOPY: CPT

## 2020-11-02 PROCEDURE — 99072 ADDL SUPL MATRL&STAF TM PHE: CPT

## 2020-11-02 RX ORDER — POLYETHYLENE GLYCOL 3350, SODIUM SULFATE ANHYDROUS, SODIUM BICARBONATE, SODIUM CHLORIDE, POTASSIUM CHLORIDE 236; 22.74; 6.74; 5.86; 2.97 G/4L; G/4L; G/4L; G/4L; G/4L
236 POWDER, FOR SOLUTION ORAL
Qty: 4000 | Refills: 0 | Status: ACTIVE | COMMUNITY
Start: 2020-09-18

## 2020-11-02 NOTE — ASSESSMENT
[FreeTextEntry1] : - request outside imaging\par - MRI neck w contrast\par - FNA of left supraclavicular mass\par - f/up after above

## 2020-11-02 NOTE — DATA REVIEWED
[de-identified] : relevant images and reports personally reviewed by me:\par Impression:\par \par Multiple thyroid nodules up to 1.0 cm above, which do not meet threshold for further follow-up per ACR guidelines. However, there are multiple abnormally enlarged left neck lymph nodes without clear fatty joana, measuring up to 2.5 cm. A neck CT with IV contrast is recommended for further evaluation.\par \par Case discussed with Vicente JOHNSON at 11:00 AM on 9/3/2020 with readback medication.\par \par ACR TI-RADS recommendation 2017:\par TR5 - FNA if ? 1cm, follow-up if 0.5 - 0.9 cm every year for 5 years\par TR4 - FNA if ? 1.5cm, follow-up if 1 - 1.4 cm in 1, 2, 3 and 5 years\par TR3 - FNA if ? 2.5cm, follow-up if 1.5 - 2.4 cm in 1, 3 and 5 years\par TR2 (2 points) & TR1 (0 points) - No FNA or follow-up\par \par \par \par \par \par \par \par \par \par \par \par CHAZ VILLALTA M.D., ATTENDING RADIOLOGIST\par This document has been electronically signed. Sep  3 2020 11:01AM\par \par CT  neck 10/14/20: 3.4cm lobulated mass in left supraclavicular region between left IJ and anterior scalenes

## 2020-11-02 NOTE — HISTORY OF PRESENT ILLNESS
[de-identified] : Patient here today with c/o hoarseness. Patient admits it has been present for years. getting worse. gets worse sometimes. She feels her voice gets low at times. No dysphagia. No choking. H/o smoking quit 50 years ago. \par H/o reflux and PND, no meds now. She also c/o of a fb sensation in her throat and coughing up off white stones. \par \par ALso complains of bilateral nose dripping when she eats.  [FreeTextEntry1] : \par 11/2/2020: Patient presents today following up on hoarseness. Patient saw Dr. Clancy in the past. Recently had thyroid sono and CT scan. Patient admits hoarseness persists. Not taking Omeprazole. She denies swelling in her neck.

## 2020-11-02 NOTE — CONSULT LETTER
[Dear  ___] : Dear  [unfilled], [Consult Letter:] : I had the pleasure of evaluating your patient, [unfilled]. [Please see my note below.] : Please see my note below. [Consult Closing:] : Thank you very much for allowing me to participate in the care of this patient.  If you have any questions, please do not hesitate to contact me. [Sincerely,] : Sincerely, [FreeTextEntry2] : Ajit Trammell MD [FreeTextEntry3] : Subha Jett MD\par Otolaryngology - Head & Neck Surgery\par

## 2020-11-02 NOTE — PHYSICAL EXAM
[de-identified] : left supraclavicular fullness, non-tender, no discrete mass [Midline] : trachea located in midline position [Normal] : no rashes

## 2020-12-08 LAB
ANION GAP SERPL CALC-SCNC: 12 MMOL/L
BUN SERPL-MCNC: 20 MG/DL
CALCIUM SERPL-MCNC: 10.1 MG/DL
CHLORIDE SERPL-SCNC: 104 MMOL/L
CO2 SERPL-SCNC: 28 MMOL/L
CREAT SERPL-MCNC: 0.9 MG/DL
GLUCOSE SERPL-MCNC: 118 MG/DL
POTASSIUM SERPL-SCNC: 4.2 MMOL/L
SODIUM SERPL-SCNC: 144 MMOL/L

## 2021-01-13 ENCOUNTER — LABORATORY RESULT (OUTPATIENT)
Age: 76
End: 2021-01-13

## 2021-01-22 ENCOUNTER — APPOINTMENT (OUTPATIENT)
Dept: OTOLARYNGOLOGY | Facility: CLINIC | Age: 76
End: 2021-01-22
Payer: MEDICARE

## 2021-01-22 VITALS — TEMPERATURE: 97.3 F

## 2021-01-22 PROCEDURE — 99214 OFFICE O/P EST MOD 30 MIN: CPT

## 2021-01-22 PROCEDURE — 99072 ADDL SUPL MATRL&STAF TM PHE: CPT

## 2021-01-22 NOTE — HISTORY OF PRESENT ILLNESS
[de-identified] : Patient here today with c/o hoarseness. Patient admits it has been present for years. getting worse. gets worse sometimes. She feels her voice gets low at times. No dysphagia. No choking. H/o smoking quit 50 years ago. \par H/o reflux and PND, no meds now. She also c/o of a fb sensation in her throat and coughing up off white stones. \par \par ALso complains of bilateral nose dripping when she eats. \par \par 11/2/2020: Patient presents today following up on hoarseness. Patient saw Dr. Clancy in the past. Recently had thyroid sono and CT scan. Patient admits hoarseness persists. Not taking Omeprazole. She denies swelling in her neck.  [FreeTextEntry1] : \par 01/22/2021: Patient returns today following up on neck mass , throat discomfort . Here to discuss FNA results, performed on 1/13/2020. Due to social situation, difficult for patient to have biopsy done sooner. FNA shows malignant lymphoma, excisional biopsy recommended.

## 2021-01-22 NOTE — ASSESSMENT
[FreeTextEntry1] : - discussed role of excisional lymph node biopsy left neck in further management of left neck mass. Discussed risks, benefits, and alternatives to treatment in extensive detail. Patient asked questions and these were answered to his apparent satisfaction. Patient gave informed written consent.\par - whole body PET ordered\par - referral to Medical Oncology\par

## 2021-01-22 NOTE — DATA REVIEWED
[de-identified] : / [de-identified] : relevant images and reports personally reviewed by me:\par PANKAJ THOMAS                       1\par \par \par \par                  Cytopathology Report\par \par \par \par \par \par           Final Diagnosis\par           LYMPH NODE, LEFT SIDED NECK,  LEVEL IV, U/S GUIDED FNA:\par           - POSITIVE FOR MALIGNANT CELLS.\par           - Atypical large lymphoid cells, consistent with malignant\par           lymphoma (see comment).\par \par           Comment:  Immunohistochemical stains show the atypical cells are\par           positive for CD45, CD15, negative for CD30, AE1/3, cam 5.2. Flow\par           cytometry identified a subpopulation of monotypic B cells,\par           support the diagosis of B-cell lymphoma. See report of [id number]           [id number] for details. Excisional biopsy for confirmation\par           and further classification is recommended. Dr. Jett is notified.\par \par           Screened by: Rhonda SHAY(ASCP)\par           Verified by: Niko Grimaldo M.D.\par           (Electronic Signature)\par           Reported on: 01/19/21 11:23 EST, [facility] [address]\par           Phone: [phone]   Fax: [phone]\par           Cytology technical processing performed at [facility]           [address] [facility], [place] [address]\par

## 2021-01-22 NOTE — REASON FOR VISIT
[Subsequent Evaluation] : a subsequent evaluation for [FreeTextEntry2] : neck mass , throat discomfort

## 2021-01-22 NOTE — PHYSICAL EXAM
[Midline] : trachea located in midline position [Normal] : no rashes [de-identified] : left supraclavicular fullness, non-tender, no discrete mass [de-identified] : see above

## 2021-01-27 ENCOUNTER — RESULT REVIEW (OUTPATIENT)
Age: 76
End: 2021-01-27

## 2021-01-27 ENCOUNTER — OUTPATIENT (OUTPATIENT)
Dept: OUTPATIENT SERVICES | Facility: HOSPITAL | Age: 76
LOS: 1 days | Discharge: HOME | End: 2021-01-27
Payer: MEDICARE

## 2021-01-27 VITALS
OXYGEN SATURATION: 97 % | HEART RATE: 64 BPM | SYSTOLIC BLOOD PRESSURE: 179 MMHG | RESPIRATION RATE: 18 BRPM | DIASTOLIC BLOOD PRESSURE: 84 MMHG | HEIGHT: 59 IN | WEIGHT: 175.05 LBS | TEMPERATURE: 98 F

## 2021-01-27 DIAGNOSIS — Z98.890 OTHER SPECIFIED POSTPROCEDURAL STATES: Chronic | ICD-10-CM

## 2021-01-27 DIAGNOSIS — Z01.818 ENCOUNTER FOR OTHER PREPROCEDURAL EXAMINATION: ICD-10-CM

## 2021-01-27 DIAGNOSIS — R22.1 LOCALIZED SWELLING, MASS AND LUMP, NECK: ICD-10-CM

## 2021-01-27 DIAGNOSIS — Z96.652 PRESENCE OF LEFT ARTIFICIAL KNEE JOINT: Chronic | ICD-10-CM

## 2021-01-27 LAB
ALBUMIN SERPL ELPH-MCNC: 4.6 G/DL — SIGNIFICANT CHANGE UP (ref 3.5–5.2)
ALP SERPL-CCNC: 92 U/L — SIGNIFICANT CHANGE UP (ref 30–115)
ALT FLD-CCNC: 16 U/L — SIGNIFICANT CHANGE UP (ref 0–41)
ANION GAP SERPL CALC-SCNC: 9 MMOL/L — SIGNIFICANT CHANGE UP (ref 7–14)
APTT BLD: 33.9 SEC — SIGNIFICANT CHANGE UP (ref 27–39.2)
AST SERPL-CCNC: 24 U/L — SIGNIFICANT CHANGE UP (ref 0–41)
BASOPHILS # BLD AUTO: 0.05 K/UL — SIGNIFICANT CHANGE UP (ref 0–0.2)
BASOPHILS NFR BLD AUTO: 0.8 % — SIGNIFICANT CHANGE UP (ref 0–1)
BILIRUB SERPL-MCNC: 0.8 MG/DL — SIGNIFICANT CHANGE UP (ref 0.2–1.2)
BUN SERPL-MCNC: 20 MG/DL — SIGNIFICANT CHANGE UP (ref 10–20)
CALCIUM SERPL-MCNC: 9.5 MG/DL — SIGNIFICANT CHANGE UP (ref 8.5–10.1)
CHLORIDE SERPL-SCNC: 103 MMOL/L — SIGNIFICANT CHANGE UP (ref 98–110)
CO2 SERPL-SCNC: 30 MMOL/L — SIGNIFICANT CHANGE UP (ref 17–32)
CREAT SERPL-MCNC: 0.9 MG/DL — SIGNIFICANT CHANGE UP (ref 0.7–1.5)
EOSINOPHIL # BLD AUTO: 0.07 K/UL — SIGNIFICANT CHANGE UP (ref 0–0.7)
EOSINOPHIL NFR BLD AUTO: 1.2 % — SIGNIFICANT CHANGE UP (ref 0–8)
GLUCOSE SERPL-MCNC: 84 MG/DL — SIGNIFICANT CHANGE UP (ref 70–99)
HCT VFR BLD CALC: 42.9 % — SIGNIFICANT CHANGE UP (ref 37–47)
HGB BLD-MCNC: 14.2 G/DL — SIGNIFICANT CHANGE UP (ref 12–16)
IMM GRANULOCYTES NFR BLD AUTO: 0.3 % — SIGNIFICANT CHANGE UP (ref 0.1–0.3)
INR BLD: 1.04 RATIO — SIGNIFICANT CHANGE UP (ref 0.65–1.3)
LYMPHOCYTES # BLD AUTO: 1.93 K/UL — SIGNIFICANT CHANGE UP (ref 1.2–3.4)
LYMPHOCYTES # BLD AUTO: 32.2 % — SIGNIFICANT CHANGE UP (ref 20.5–51.1)
MCHC RBC-ENTMCNC: 29.1 PG — SIGNIFICANT CHANGE UP (ref 27–31)
MCHC RBC-ENTMCNC: 33.1 G/DL — SIGNIFICANT CHANGE UP (ref 32–37)
MCV RBC AUTO: 87.9 FL — SIGNIFICANT CHANGE UP (ref 81–99)
MONOCYTES # BLD AUTO: 0.43 K/UL — SIGNIFICANT CHANGE UP (ref 0.1–0.6)
MONOCYTES NFR BLD AUTO: 7.2 % — SIGNIFICANT CHANGE UP (ref 1.7–9.3)
NEUTROPHILS # BLD AUTO: 3.49 K/UL — SIGNIFICANT CHANGE UP (ref 1.4–6.5)
NEUTROPHILS NFR BLD AUTO: 58.3 % — SIGNIFICANT CHANGE UP (ref 42.2–75.2)
NRBC # BLD: 0 /100 WBCS — SIGNIFICANT CHANGE UP (ref 0–0)
PLATELET # BLD AUTO: 165 K/UL — SIGNIFICANT CHANGE UP (ref 130–400)
POTASSIUM SERPL-MCNC: 4.3 MMOL/L — SIGNIFICANT CHANGE UP (ref 3.5–5)
POTASSIUM SERPL-SCNC: 4.3 MMOL/L — SIGNIFICANT CHANGE UP (ref 3.5–5)
PROT SERPL-MCNC: 6.7 G/DL — SIGNIFICANT CHANGE UP (ref 6–8)
PROTHROM AB SERPL-ACNC: 12 SEC — SIGNIFICANT CHANGE UP (ref 9.95–12.87)
RBC # BLD: 4.88 M/UL — SIGNIFICANT CHANGE UP (ref 4.2–5.4)
RBC # FLD: 12.1 % — SIGNIFICANT CHANGE UP (ref 11.5–14.5)
SODIUM SERPL-SCNC: 142 MMOL/L — SIGNIFICANT CHANGE UP (ref 135–146)
WBC # BLD: 5.99 K/UL — SIGNIFICANT CHANGE UP (ref 4.8–10.8)
WBC # FLD AUTO: 5.99 K/UL — SIGNIFICANT CHANGE UP (ref 4.8–10.8)

## 2021-01-27 PROCEDURE — 93010 ELECTROCARDIOGRAM REPORT: CPT

## 2021-01-27 PROCEDURE — 71046 X-RAY EXAM CHEST 2 VIEWS: CPT | Mod: 26

## 2021-01-27 NOTE — H&P PST ADULT - VENOUS THROMBOEMBOLISM BMI
Breast weights  Right Breast Preliminary: 0.790kg  Right Breast Final: 0.790kg    Left Breast Preliminary: 0.580kg  Left Breast Final: 0.579kg
(1) obesity (BMI greater than 25)

## 2021-01-27 NOTE — H&P PST ADULT - NSICDXPASTMEDICALHX_GEN_ALL_CORE_FT
PAST MEDICAL HISTORY:  Arthritis oa    Cancer, colon     Deep vein thrombosis (DVT) left arm after conon surgery 2017 with picc line    HTN (hypertension)     Hypercholesteremia     Skin cancer

## 2021-01-27 NOTE — H&P PST ADULT - NSICDXPASTSURGICALHX_GEN_ALL_CORE_FT
PAST SURGICAL HISTORY:  H/O umbilical hernia repair 2001    History of colon resection 2002    History of incisional hernia repair 2003    History of knee replacement, total, left 1-2017    History of ventral hernia repair per pt my hernia became entangled in mesh  2-2017

## 2021-01-27 NOTE — H&P PST ADULT - HISTORY OF PRESENT ILLNESS
1/22/2021 ent note  History of Present Illness    Patient here today with c/o hoarseness. Patient admits it has been present for years. getting worse. gets worse sometimes. She feels her voice gets low at times. No dysphagia. No choking. H/o smoking quit 50 years ago.   H/o reflux and PND, no meds now. She also c/o of a fb sensation in her throat and coughing up off white stones.     ALso complains of bilateral nose dripping when she eats.     11/2/2020: Patient presents today following up on hoarseness. Patient saw Dr. Clancy in the past. Recently had thyroid sono and CT scan. Patient admits hoarseness persists. Not taking Omeprazole. She denies swelling in her neck.   Interval History:   01/22/2021: Patient returns today following up on neck mass , throat discomfort. Here to discuss FNA results, performed on 1/13/2020. Due to social situation, difficult for patient to have biopsy done sooner. FNA shows malignant lymphoma, excisional biopsy recommended.       1/27/2021 past note  as per pt having biopsy of lymph nodes ne thyroid left side  had sore throat x several months- seen ent 10/2020  seen several ent drs, eventual seen by dr hickey and now for planned procedure    PATIENT CURRENTLY DENIES CHEST PAIN  SHORTNESS OF BREATH  PALPITATIONS,  DYSURIA, OR UPPER RESPIRATORY INFECTION IN PAST 2 WEEKS  EXERCISE  TOLERANCE  1-2 FLIGHT OF STAIRS  WITHOUT SHORTNESS OF BREATH  denies travel outside the [place] in the past 30 days  pt denies any covid s/s, or tested positive in the past  pt advised self quarantine till day of procedure  Anesthesia Alert  NO--Difficult Airway  NO--History of neck surgery or radiation  NO--Limited ROM of neck  NO--History of Malignant hyperthermia  NO--No personal or family history of Pseudocholinesterase deficiency.  NO--Prior Anesthesia Complication  NO--Latex Allergy  NO--Loose teeth  NO--History of Rheumatoid Arthritis  NO--AUSTIN  NO--Other_____      Encounter for other preprocedural examination    Localized swelling, mass and lump, neck    ^R22.1/83099    2/5/21 [facility]    Encounter for other preprocedural examination    Localized swelling, mass and lump, neck    Cancer, colon    Arthritis    Hypercholesteremia    HTN (hypertension)    History of incisional hernia repair    H/O umbilical hernia repair    History of colon resection    History of knee replacement, total, left    History of ventral hernia repair

## 2021-01-30 ENCOUNTER — LABORATORY RESULT (OUTPATIENT)
Age: 76
End: 2021-01-30

## 2021-02-03 ENCOUNTER — OUTPATIENT (OUTPATIENT)
Dept: OUTPATIENT SERVICES | Facility: HOSPITAL | Age: 76
LOS: 1 days | Discharge: HOME | End: 2021-02-03

## 2021-02-03 ENCOUNTER — LABORATORY RESULT (OUTPATIENT)
Age: 76
End: 2021-02-03

## 2021-02-03 DIAGNOSIS — Z98.890 OTHER SPECIFIED POSTPROCEDURAL STATES: Chronic | ICD-10-CM

## 2021-02-03 DIAGNOSIS — Z96.652 PRESENCE OF LEFT ARTIFICIAL KNEE JOINT: Chronic | ICD-10-CM

## 2021-02-03 DIAGNOSIS — Z11.59 ENCOUNTER FOR SCREENING FOR OTHER VIRAL DISEASES: ICD-10-CM

## 2021-02-03 PROBLEM — C44.90 UNSPECIFIED MALIGNANT NEOPLASM OF SKIN, UNSPECIFIED: Chronic | Status: ACTIVE | Noted: 2021-01-27

## 2021-02-03 PROBLEM — I82.409 ACUTE EMBOLISM AND THROMBOSIS OF UNSPECIFIED DEEP VEINS OF UNSPECIFIED LOWER EXTREMITY: Chronic | Status: ACTIVE | Noted: 2021-01-27

## 2021-02-05 ENCOUNTER — APPOINTMENT (OUTPATIENT)
Dept: OTOLARYNGOLOGY | Facility: HOSPITAL | Age: 76
End: 2021-02-05

## 2021-02-05 ENCOUNTER — RESULT REVIEW (OUTPATIENT)
Age: 76
End: 2021-02-05

## 2021-02-05 ENCOUNTER — OUTPATIENT (OUTPATIENT)
Dept: OUTPATIENT SERVICES | Facility: HOSPITAL | Age: 76
LOS: 1 days | Discharge: HOME | End: 2021-02-05
Payer: MEDICARE

## 2021-02-05 VITALS
RESPIRATION RATE: 18 BRPM | HEIGHT: 59 IN | TEMPERATURE: 98 F | WEIGHT: 175.05 LBS | HEART RATE: 59 BPM | SYSTOLIC BLOOD PRESSURE: 159 MMHG | OXYGEN SATURATION: 97 % | DIASTOLIC BLOOD PRESSURE: 78 MMHG

## 2021-02-05 VITALS — SYSTOLIC BLOOD PRESSURE: 132 MMHG | DIASTOLIC BLOOD PRESSURE: 69 MMHG

## 2021-02-05 DIAGNOSIS — Z98.890 OTHER SPECIFIED POSTPROCEDURAL STATES: Chronic | ICD-10-CM

## 2021-02-05 DIAGNOSIS — Z96.652 PRESENCE OF LEFT ARTIFICIAL KNEE JOINT: Chronic | ICD-10-CM

## 2021-02-05 DIAGNOSIS — T84.032A MECHANICAL LOOSENING OF INTERNAL RIGHT KNEE PROSTHETIC JOINT, INITIAL ENCOUNTER: Chronic | ICD-10-CM

## 2021-02-05 PROCEDURE — 88342 IMHCHEM/IMCYTCHM 1ST ANTB: CPT | Mod: 26,59

## 2021-02-05 PROCEDURE — 88341 IMHCHEM/IMCYTCHM EA ADD ANTB: CPT | Mod: 26,59

## 2021-02-05 PROCEDURE — 88307 TISSUE EXAM BY PATHOLOGIST: CPT | Mod: 26

## 2021-02-05 PROCEDURE — 88189 FLOWCYTOMETRY/READ 16 & >: CPT

## 2021-02-05 PROCEDURE — 88360 TUMOR IMMUNOHISTOCHEM/MANUAL: CPT | Mod: 26

## 2021-02-05 PROCEDURE — 38510 BIOPSY/REMOVAL LYMPH NODES: CPT | Mod: LT

## 2021-02-05 RX ORDER — ONDANSETRON 8 MG/1
4 TABLET, FILM COATED ORAL ONCE
Refills: 0 | Status: COMPLETED | OUTPATIENT
Start: 2021-02-05 | End: 2021-02-05

## 2021-02-05 RX ORDER — METOCLOPRAMIDE HCL 10 MG
10 TABLET ORAL ONCE
Refills: 0 | Status: COMPLETED | OUTPATIENT
Start: 2021-02-05 | End: 2021-02-05

## 2021-02-05 RX ORDER — HYDROMORPHONE HYDROCHLORIDE 2 MG/ML
0.6 INJECTION INTRAMUSCULAR; INTRAVENOUS; SUBCUTANEOUS
Refills: 0 | Status: DISCONTINUED | OUTPATIENT
Start: 2021-02-05 | End: 2021-02-05

## 2021-02-05 RX ORDER — MUPIROCIN 20 MG/G
1 OINTMENT TOPICAL
Qty: 0 | Refills: 0 | DISCHARGE

## 2021-02-05 RX ORDER — ACETAMINOPHEN 500 MG
1000 TABLET ORAL ONCE
Refills: 0 | Status: COMPLETED | OUTPATIENT
Start: 2021-02-05 | End: 2021-02-05

## 2021-02-05 RX ORDER — ALUMINUM ZIRCONIUM TRICHLOROHYDREX GLY 0.2 G/G
1 STICK TOPICAL
Qty: 0 | Refills: 0 | DISCHARGE

## 2021-02-05 RX ORDER — HYDROMORPHONE HYDROCHLORIDE 2 MG/ML
0.3 INJECTION INTRAMUSCULAR; INTRAVENOUS; SUBCUTANEOUS
Refills: 0 | Status: DISCONTINUED | OUTPATIENT
Start: 2021-02-05 | End: 2021-02-05

## 2021-02-05 RX ORDER — SODIUM CHLORIDE 9 MG/ML
1000 INJECTION, SOLUTION INTRAVENOUS
Refills: 0 | Status: DISCONTINUED | OUTPATIENT
Start: 2021-02-05 | End: 2021-02-05

## 2021-02-05 RX ADMIN — SODIUM CHLORIDE 75 MILLILITER(S): 9 INJECTION, SOLUTION INTRAVENOUS at 16:33

## 2021-02-05 RX ADMIN — Medication 400 MILLIGRAM(S): at 16:48

## 2021-02-05 RX ADMIN — HYDROMORPHONE HYDROCHLORIDE 0.6 MILLIGRAM(S): 2 INJECTION INTRAMUSCULAR; INTRAVENOUS; SUBCUTANEOUS at 17:48

## 2021-02-05 RX ADMIN — HYDROMORPHONE HYDROCHLORIDE 0.6 MILLIGRAM(S): 2 INJECTION INTRAMUSCULAR; INTRAVENOUS; SUBCUTANEOUS at 17:10

## 2021-02-05 RX ADMIN — ONDANSETRON 4 MILLIGRAM(S): 8 TABLET, FILM COATED ORAL at 18:57

## 2021-02-05 RX ADMIN — Medication 10 MILLIGRAM(S): at 20:18

## 2021-02-05 RX ADMIN — HYDROMORPHONE HYDROCHLORIDE 0.3 MILLIGRAM(S): 2 INJECTION INTRAMUSCULAR; INTRAVENOUS; SUBCUTANEOUS at 16:32

## 2021-02-05 NOTE — CHART NOTE - NSCHARTNOTEFT_GEN_A_CORE
PACU ANESTHESIA ADMISSION NOTE      Procedure: Deep cervical lymphadenectomy    Excisional biopsy of deep cervical lymph node      Post op diagnosis:  Cervical lymphadenopathy    Lymphoma        ____  Intubated  TV:______       Rate: ______      FiO2: ______    ___x_  Patent Airway    __x__  Full return of protective reflexes    __x__  Full recovery from anesthesia / back to baseline     Vitals:   T:   97.5        R:     14             BP:    140/70              Sat:  98%                 P: 77      Mental Status:  _x___ Awake   _____ Alert   _____ Drowsy   _____ Sedated    Nausea/Vomiting:  __x__ NO  ______Yes,   See Post - Op Orders          Pain Scale (0-10):  __0___    Treatment: ____ None    _x___ See Post - Op/PCA Orders    Post - Operative Fluids:   ____ Oral   __x__ See Post - Op Orders    Plan: Discharge:   __x__Home       _____Floor     _____Critical Care    _____  Other:_________________    Comments:

## 2021-02-05 NOTE — ASU DISCHARGE PLAN (ADULT/PEDIATRIC) - CARE PROVIDER_API CALL
Subha Jett)  Otolaryngology  [address] [place] [address], [address] [place] [address]  Phone: [phone]  Fax: [phone]  Follow Up Time: 1 week

## 2021-02-05 NOTE — PRE-ANESTHESIA EVALUATION ADULT - NSANTHOSAYNRD_GEN_A_CORE
No. AUSTIN screening performed.  STOP BANG Legend: 0-2 = LOW Risk; 3-4 = INTERMEDIATE Risk; 5-8 = HIGH Risk
[address] Exp Problem Focused - Mod. Complex

## 2021-02-05 NOTE — ASU DISCHARGE PLAN (ADULT/PEDIATRIC) - PAIN MANAGEMENT
OK to take tylenol over the counter as needed for pain control/Take over the counter pain medication

## 2021-02-05 NOTE — BRIEF OPERATIVE NOTE - NSICDXBRIEFPREOP_GEN_ALL_CORE_FT
PRE-OP DIAGNOSIS:  Lymphoma 05-Feb-2021 15:55:13  Subha Jett  Cervical lymphadenopathy 05-Feb-2021 15:54:35  Subha Jett

## 2021-02-05 NOTE — BRIEF OPERATIVE NOTE - NSICDXBRIEFPOSTOP_GEN_ALL_CORE_FT
POST-OP DIAGNOSIS:  Lymphoma 05-Feb-2021 15:55:18  Subha Jett  Cervical lymphadenopathy 05-Feb-2021 15:55:24  Subha Jett

## 2021-02-05 NOTE — ASU PATIENT PROFILE, ADULT - PSH
H/O umbilical hernia repair  2001  History of colon resection  2002  History of incisional hernia repair  2003  History of knee replacement, total, left  1-2017  History of ventral hernia repair  per pt my hernia became entangled in mesh  2-2017  Loose right total knee arthroplasty

## 2021-02-05 NOTE — ASU PATIENT PROFILE, ADULT - PMH
Arthritis  oa  Cancer, colon    Deep vein thrombosis (DVT)  left arm after conon surgery 2017 with picc line  HTN (hypertension)    Hypercholesteremia    Skin cancer

## 2021-02-05 NOTE — ASU DISCHARGE PLAN (ADULT/PEDIATRIC) - CALL YOUR DOCTOR IF YOU HAVE ANY OF THE FOLLOWING:
Bleeding that does not stop/Swelling that gets worse/Pain not relieved by Medications/Wound/Surgical Site with redness, or foul smelling discharge or pus/Inability to tolerate liquids or foods

## 2021-02-06 ENCOUNTER — EMERGENCY (EMERGENCY)
Facility: HOSPITAL | Age: 76
LOS: 0 days | Discharge: HOME | End: 2021-02-06
Attending: EMERGENCY MEDICINE | Admitting: EMERGENCY MEDICINE
Payer: MEDICARE

## 2021-02-06 VITALS
HEIGHT: 59 IN | DIASTOLIC BLOOD PRESSURE: 72 MMHG | RESPIRATION RATE: 20 BRPM | WEIGHT: 178.57 LBS | OXYGEN SATURATION: 97 % | HEART RATE: 79 BPM | TEMPERATURE: 98 F | SYSTOLIC BLOOD PRESSURE: 141 MMHG

## 2021-02-06 DIAGNOSIS — G89.18 OTHER ACUTE POSTPROCEDURAL PAIN: ICD-10-CM

## 2021-02-06 DIAGNOSIS — Z98.890 OTHER SPECIFIED POSTPROCEDURAL STATES: Chronic | ICD-10-CM

## 2021-02-06 DIAGNOSIS — M54.2 CERVICALGIA: ICD-10-CM

## 2021-02-06 DIAGNOSIS — Z88.0 ALLERGY STATUS TO PENICILLIN: ICD-10-CM

## 2021-02-06 DIAGNOSIS — Z96.652 PRESENCE OF LEFT ARTIFICIAL KNEE JOINT: Chronic | ICD-10-CM

## 2021-02-06 DIAGNOSIS — R60.0 LOCALIZED EDEMA: ICD-10-CM

## 2021-02-06 DIAGNOSIS — E78.5 HYPERLIPIDEMIA, UNSPECIFIED: ICD-10-CM

## 2021-02-06 DIAGNOSIS — Z79.899 OTHER LONG TERM (CURRENT) DRUG THERAPY: ICD-10-CM

## 2021-02-06 DIAGNOSIS — Z96.651 PRESENCE OF RIGHT ARTIFICIAL KNEE JOINT: ICD-10-CM

## 2021-02-06 DIAGNOSIS — Z98.890 OTHER SPECIFIED POSTPROCEDURAL STATES: ICD-10-CM

## 2021-02-06 DIAGNOSIS — Z96.652 PRESENCE OF LEFT ARTIFICIAL KNEE JOINT: ICD-10-CM

## 2021-02-06 DIAGNOSIS — T84.032A MECHANICAL LOOSENING OF INTERNAL RIGHT KNEE PROSTHETIC JOINT, INITIAL ENCOUNTER: Chronic | ICD-10-CM

## 2021-02-06 DIAGNOSIS — I10 ESSENTIAL (PRIMARY) HYPERTENSION: ICD-10-CM

## 2021-02-06 PROCEDURE — 99284 EMERGENCY DEPT VISIT MOD MDM: CPT

## 2021-02-06 NOTE — ED PROVIDER NOTE - OBJECTIVE STATEMENT
76 y/o female s/p excisional LN biopsy to L neck POD #1 presenting with edema and tenderness to incision site. Pt reports she noticed the ecchymosis and edema increasing and became anxious and came to ED. She currently has a ROSI drain with 20cc serosang fluid inside. ROSI bulb was off suction and patient did not seem to know how to operate it. ROSI was placed back on suction and  it started draining well. Pt was taught how to make maintain her ROSI drain. Denies any fevers, chills, N/V. Tolerating soft diet and liquids well.

## 2021-02-06 NOTE — ED PROVIDER NOTE - PROGRESS NOTE DETAILS
ENT saw pt bedside. Showed her how to work the drain. Says drain is working appropriately. Says pt can be d/sulema -hernandez

## 2021-02-06 NOTE — CONSULT NOTE ADULT - ASSESSMENT
76 y/o female s/p excisional LN biopsy POD #1 presenting with ttp and mild edema/ecchymosis to incision site  - Area healing well  - ROSI draining well and holding suction  - Pt educated on maintenance of ROSI drain  - Will follow up with Dr. Jett as o/p in Monday   - Return to ED if increased edema, active drainage from site, signs of infection, fevers >101.3, intractable pain/vomiting  - Pt understands and agrees with plan

## 2021-02-06 NOTE — ED ADULT TRIAGE NOTE - CHIEF COMPLAINT QUOTE
I just had surgery here yesterday, I went home about 11, I have this drainage tube and it seems to be backing up, its all swollen here I just had surgery here yesterday, I went home about 11, I have this drainage tube and it seems to be backing up, its all swollen here - patient      phone # Davonte [phone]

## 2021-02-06 NOTE — ED PROVIDER NOTE - ATTENDING CONTRIBUTION TO CARE
I personally evaluated the patient. I reviewed the Resident’s or Physician Assistant’s note (as assigned above), and agree with the findings and plan except as documented in my note.     75 female here for concerns about wound management after recent ENT surgery done by [facility] physician.  States her ROSI drain is not functioning.  No other complaints.    ROS otherwise unremarkable    PE: female in no distress. CV: pulses intact. CHEST: normal work of breathing. NECK: left side ROSI drain noted with ~20 mL serosanguinous fluid in reservoir.  No signs of cellulitis. SKIN: normal. EXT: FROM. NEURO: AAO 3 no focal deficits.     Impression: wound check    Plan: ENT consult, supportive care and reevaluation

## 2021-02-06 NOTE — ED PROVIDER NOTE - CLINICAL SUMMARY MEDICAL DECISION MAKING FREE TEXT BOX
75 female here for wound check after recent biopsy by ENT, has ROSI drain in place and felt concern about the device. Device working well on ED eval, had ENT consult for courtesy and continuity of care, with agreement by ENT service, patient given additional instructions and reassurance on wound device with plan for outpatient management with return and follow up instructions.

## 2021-02-06 NOTE — ED PROVIDER NOTE - NSFOLLOWUPINSTRUCTIONS_ED_ALL_ED_FT
Acute Neck Pain    WHAT YOU NEED TO KNOW:    Acute neck pain starts suddenly, increases quickly, and goes away in a few days. The pain may come and go, or be worse with certain movements. The pain may be only in your neck, or it may move to your arms, back, or shoulders. You may also have pain that starts in another body area and moves to your neck. Vertebral Column         DISCHARGE INSTRUCTIONS:    Return to the emergency department if:     You have an injury that causes neck pain and shooting pain down your arms or legs.      Your neck pain suddenly becomes severe.      You have neck pain along with numbness, tingling, or weakness in your arms or legs.      You have a stiff neck, a headache, and a fever.    Contact your healthcare provider if:     You have new or worsening symptoms.      Your symptoms continue even after treatment.      You have questions or concerns about your condition or care.    Medicines:     NSAIDs, such as ibuprofen, help decrease swelling, pain, and fever. This medicine is available without a doctor's order. Ask your healthcare provider which medicine to take and how often to take it. Follow directions. NSAIDs can cause stomach bleeding or kidney problems if not taken correctly. If you take blood thinner medicine, always ask if NSAIDs are safe for you.      Acetaminophen helps decrease pain and fever. Ask your healthcare provider how much to take and how often to take it. Follow directions. Acetaminophen can cause liver damage if not taken correctly.      Steroid medicine may be used to reduce inflammation. This can help relieve pain caused by swelling.      Take your medicine as directed. Contact your healthcare provider if you think your medicine is not helping or if you have side effects. Tell him or her if you are allergic to any medicine. Keep a list of the medicines, vitamins, and herbs you take. Include the amounts, and when and why you take them. Bring the list or the pill bottles to follow-up visits. Carry your medicine list with you in case of an emergency.    Manage or prevent acute neck pain:     Rest your neck as directed. Do not make sudden movements, such as turning your head quickly. Your healthcare provider may recommend you wear a cervical collar for a short time. The collar will prevent you from moving your head. This will give your neck time to heal if an injury is causing your neck pain. Ask your healthcare provider when you can return to sports or other normal daily activities.      Apply heat as directed. Heat helps relieve pain and swelling. Use a heat wrap, or soak a small towel in warm water. Wring out the extra water. Apply the heat wrap or towel for 20 minutes every hour, or as directed.      Apply ice as directed. Ice helps relieve pain and swelling, and can help prevent tissue damage. Use an ice pack, or put ice in a bag. Cover the ice pack or back with a towel before you apply it to your neck. Apply the ice pack or ice for 15 minutes every hour, or as directed. Your healthcare provider can tell you how often to apply ice.      Do neck exercises as directed. Neck exercises help strengthen the muscles and increase range of motion. Your healthcare provider will tell you which exercises are right for you. He may give you instructions, or he may recommend that you work with a physical therapist. Your healthcare provider or therapist can make sure you are doing the exercises correctly.       Maintain good posture. Try to keep your head and shoulders lifted when you sit. If you work in front of a computer, make sure the monitor is at the right level. You should not need to look up down to see the screen. You should also not have to lean forward to be able to read what is on the screen. Make sure your keyboard, mouse, and other computer items are placed where you do not have to extend your shoulder to reach them. Get up often if you work in front of a computer or sit for long periods of time. Stretch or walk around to keep your neck muscles loose.    Follow up with your healthcare provider as directed: Your healthcare provider may refer you to a specialist if your pain does not get better with treatment. Write down your questions so you remember to ask them during your visits.       © Copyright [employer] 2019 All illustrations and images included in CareNotes are the copyrighted property of [employer].D.A.M., Inc. or [employer].

## 2021-02-06 NOTE — ED PROVIDER NOTE - CARE PROVIDER_API CALL
Subha Jett)  Otolaryngology  [address] [place] [address], [address] [place] [address]  Phone: [phone]  Fax: [phone]  Follow Up Time:

## 2021-02-06 NOTE — ED PROVIDER NOTE - NS ED ROS FT
Eyes:  No visual changes, eye pain or discharge.  ENMT:  see hPI  Cardiac:  No chest pain, SOB or edema. No chest pain with exertion.  Respiratory:  No cough or respiratory distress. No hemoptysis. No history of asthma or RAD.  GI:  No nausea, vomiting, diarrhea or abdominal pain.  :  No dysuria, frequency or burning.  MS:  No myalgia, muscle weakness, joint pain or back pain.  Neuro:  No headache or weakness.  No LOC.  Skin:  No skin rash.   Endocrine: No history of thyroid disease or diabetes.

## 2021-02-06 NOTE — ED ADULT NURSE NOTE - CHIEF COMPLAINT QUOTE
I just had surgery here yesterday, I went home about 11, I have this drainage tube and it seems to be backing up, its all swollen here - patient      phone # Davonte [phone]

## 2021-02-06 NOTE — ED PROVIDER NOTE - PATIENT PORTAL LINK FT
You can access the FollowMyHealth Patient Portal offered by [facility] by registering at the following website: [email or web address]. By joining [employer]’s FollowMyHealth portal, you will also be able to view your health information using other applications (apps) compatible with our system.

## 2021-02-06 NOTE — CONSULT NOTE ADULT - SUBJECTIVE AND OBJECTIVE BOX
ENT DAILY PROGRESS NOTE    Overnight events/Interval HPI: HPI:  76 y/o female s/p excisional LN biopsy to L neck POD #1 presenting with edema and tenderness to incision site. Pt reports she noticed the ecchymosis and edema increasing and became anxious and came to ED. She currently has a ROSI drain with 20cc serosang fluid inside. ROSI bulb was off suction and patient did not seem to know how to operate it. ROSI was placed back on suction and  it started draining well. Pt was taught how to make maintain her ROSI drain. Denies any fevers, chills, N/V. Tolerating soft diet and liquids well.      REVIEW OF SYSTEMS   [x] A ten-point review of systems was otherwise negative except as noted.    Allergies    penicillin (Other)    Intolerances        MEDICATIONS:  Antiinfectives:     IV fluids:    Hematologic/Anticoagulation:    Pain medications/Neuro:    Endocrine Medications:     All other standing medications:     All other PRN medications:      Vital Signs Last 24 Hrs  T(C): 36.7 (06 Feb 2021 04:00), Max: 36.8 (05 Feb 2021 18:15)  T(F): 98 (06 Feb 2021 04:00), Max: 98.3 (05 Feb 2021 18:15)  HR: 79 (06 Feb 2021 04:00) (59 - 82)  BP: 141/72 (06 Feb 2021 04:00) (93/54 - 159/78)  BP(mean): --  RR: 20 (06 Feb 2021 04:00) (14 - 22)  SpO2: 97% (06 Feb 2021 04:00) (96% - 99%)          PHYSICAL EXAM:    ENT EXAM-   Constitutional: Well-developed, well-nourished.  No hoarseness.  NAD, awake/alert   Head:  normocephalic, atraumatic.   OC/OP: Floor of mouth, buccal mucosa, lips, hard palate, soft palate, uvula, posterior pharyngeal wall normal.  Mucosa moist.  Neck:  Trachea midline.  Thyroid, parotid and submandibular glands normal. + ttp to L neck. + mild edema and ecchymosis to incision site. No active drainage. no crepitus. Rosi draining serosang fluid 20cc in bulb

## 2021-02-06 NOTE — ED ADULT NURSE NOTE - OBJECTIVE STATEMENT
Pt presents to ED s/p michelle drain "backing up" post op surgery yesterday. Denies any complaints of pain or discomfort.

## 2021-02-06 NOTE — ED PROVIDER NOTE - PHYSICAL EXAMINATION
CONSTITUTIONAL: Well-developed; well-nourished; in no acute distress.   SKIN: warm, dry  HEAD: Normocephalic; atraumatic.  EYES: no conjunctival injection. PERRL.   ENT: No nasal discharge; airway clear. + ttp to L neck. + mild edema and ecchymosis to incision site. No active drainage. no crepitus. Preston draining serosang fluid 20cc in bulb   NECK: Supple; non tender.  CARD: S1, S2 normal; no murmurs, gallops, or rubs. Regular rate and rhythm.   RESP: No wheezes, rales or rhonchi.  ABD: soft ntnd  EXT: Normal ROM.  No clubbing, cyanosis or edema.   LYMPH: No acute cervical adenopathy.  NEURO: Alert, oriented, grossly unremarkable  PSYCH: Cooperative, appropriate.

## 2021-02-08 ENCOUNTER — APPOINTMENT (OUTPATIENT)
Dept: OTOLARYNGOLOGY | Facility: CLINIC | Age: 76
End: 2021-02-08
Payer: MEDICARE

## 2021-02-08 PROCEDURE — 99024 POSTOP FOLLOW-UP VISIT: CPT

## 2021-02-08 NOTE — ASSESSMENT
[FreeTextEntry1] : Patient will f/u tomorrow with Dr. Jett\par She was instructed to maintain a very low fat diet.

## 2021-02-08 NOTE — REASON FOR VISIT
[Post-Operative Visit] : a post-operative visit [FreeTextEntry2] : s/p excisional lymph node biopsy left neck 2/5/21

## 2021-02-08 NOTE — HISTORY OF PRESENT ILLNESS
[de-identified] : Patient here today with c/o hoarseness. Patient admits it has been present for years. getting worse. gets worse sometimes. She feels her voice gets low at times. No dysphagia. No choking. H/o smoking quit 50 years ago. \par H/o reflux and PND, no meds now. She also c/o of a fb sensation in her throat and coughing up off white stones. \par \par ALso complains of bilateral nose dripping when she eats. \par \par 11/2/2020: Patient presents today following up on hoarseness. Patient saw Dr. Clancy in the past. Recently had thyroid sono and CT scan. Patient admits hoarseness persists. Not taking Omeprazole. She denies swelling in her neck. \par \par \par 01/22/2021: Patient returns today following up on neck mass , throat discomfort . Here to discuss FNA results, performed on 1/13/2020. Due to social situation, difficult for patient to have biopsy done sooner. FNA shows malignant lymphoma, excisional biopsy recommended.  [FreeTextEntry1] : \par 2/8/21: Patient here s/p excisional lymph node biopsy left neck 2/5/21. Patient presents for drain removal. Doing well post-op. She emptied the drain twice since discharge 3 cc total of milky white fluid.

## 2021-02-09 ENCOUNTER — APPOINTMENT (OUTPATIENT)
Dept: OTOLARYNGOLOGY | Facility: CLINIC | Age: 76
End: 2021-02-09
Payer: MEDICARE

## 2021-02-09 PROCEDURE — 99024 POSTOP FOLLOW-UP VISIT: CPT

## 2021-02-09 NOTE — PHYSICAL EXAM
[de-identified] : left neck incision c/d/i; ~5cc cloudy/milky fluid in ROSI drain [Midline] : trachea located in midline position [Normal] : no rashes [de-identified] : see above

## 2021-02-09 NOTE — REASON FOR VISIT
[Subsequent Evaluation] : a subsequent evaluation for [FreeTextEntry2] : lymph node biopsy left neck 2/5/21

## 2021-02-09 NOTE — HISTORY OF PRESENT ILLNESS
[de-identified] : Patient here today with c/o hoarseness. Patient admits it has been present for years. getting worse. gets worse sometimes. She feels her voice gets low at times. No dysphagia. No choking. H/o smoking quit 50 years ago. \par H/o reflux and PND, no meds now. She also c/o of a fb sensation in her throat and coughing up off white stones. \par \par ALso complains of bilateral nose dripping when she eats. \par \par 11/2/2020: Patient presents today following up on hoarseness. Patient saw Dr. Clancy in the past. Recently had thyroid sono and CT scan. Patient admits hoarseness persists. Not taking Omeprazole. She denies swelling in her neck. \par \par \par 01/22/2021: Patient returns today following up on neck mass , throat discomfort . Here to discuss FNA results, performed on 1/13/2020. Due to social situation, difficult for patient to have biopsy done sooner. FNA shows malignant lymphoma, excisional biopsy recommended. \par \par \par 2/8/21: Patient here s/p excisional lymph node biopsy left neck 2/5/21. Patient presents for drain removal. Doing well post-op. She emptied the drain twice since discharge 3 cc total of milky white fluid.  [FreeTextEntry1] : \par 2/9/21: Patient here s/p excisional lymph node biopsy left neck 2/5/21. Continues to have milky fluid from ROSI drain, ~5cc from drain since yesterday morning.

## 2021-02-09 NOTE — ASSESSMENT
[FreeTextEntry1] : - fat free diet for 1 week\par - keep drain in for now\par - f/up in 1 week for drain removal

## 2021-02-10 ENCOUNTER — APPOINTMENT (OUTPATIENT)
Dept: HEMATOLOGY ONCOLOGY | Facility: CLINIC | Age: 76
End: 2021-02-10
Payer: MEDICARE

## 2021-02-10 ENCOUNTER — OUTPATIENT (OUTPATIENT)
Dept: OUTPATIENT SERVICES | Facility: HOSPITAL | Age: 76
LOS: 1 days | Discharge: HOME | End: 2021-02-10
Payer: MEDICARE

## 2021-02-10 VITALS
BODY MASS INDEX: 34.68 KG/M2 | DIASTOLIC BLOOD PRESSURE: 67 MMHG | RESPIRATION RATE: 14 BRPM | HEIGHT: 59 IN | SYSTOLIC BLOOD PRESSURE: 142 MMHG | HEART RATE: 76 BPM | TEMPERATURE: 98.1 F | WEIGHT: 172 LBS

## 2021-02-10 DIAGNOSIS — R22.1 LOCALIZED SWELLING, MASS AND LUMP, NECK: ICD-10-CM

## 2021-02-10 DIAGNOSIS — Z13.71 ENCOUNTER FOR NONPROCREATIVE SCREENING FOR GENETIC DISEASE CARRIER STATUS: ICD-10-CM

## 2021-02-10 DIAGNOSIS — Z98.890 OTHER SPECIFIED POSTPROCEDURAL STATES: Chronic | ICD-10-CM

## 2021-02-10 DIAGNOSIS — Z98.890 OTHER SPECIFIED POSTPROCEDURAL STATES: ICD-10-CM

## 2021-02-10 DIAGNOSIS — Z85.048 PERSONAL HISTORY OF OTHER MALIGNANT NEOPLASM OF RECTUM, RECTOSIGMOID JUNCTION, AND ANUS: ICD-10-CM

## 2021-02-10 DIAGNOSIS — Z80.3 FAMILY HISTORY OF MALIGNANT NEOPLASM OF BREAST: ICD-10-CM

## 2021-02-10 DIAGNOSIS — Z96.652 PRESENCE OF LEFT ARTIFICIAL KNEE JOINT: Chronic | ICD-10-CM

## 2021-02-10 DIAGNOSIS — T84.032A MECHANICAL LOOSENING OF INTERNAL RIGHT KNEE PROSTHETIC JOINT, INITIAL ENCOUNTER: Chronic | ICD-10-CM

## 2021-02-10 LAB — GLUCOSE BLDC GLUCOMTR-MCNC: 106 MG/DL — HIGH (ref 70–99)

## 2021-02-10 PROCEDURE — 78815 PET IMAGE W/CT SKULL-THIGH: CPT | Mod: 26,PI

## 2021-02-10 PROCEDURE — 99203 OFFICE O/P NEW LOW 30 MIN: CPT

## 2021-02-10 NOTE — ASSESSMENT
[FreeTextEntry1] : #LEft supraclavicular adenopathy with FNA suspicious for Lymphoma and s/p excisional biopsy, results are pending, no B symptoms, PET/CT shows likely only post surgical findings  \par -she will RTC in 2 weeks by than her path should be avaialbe and we can go from there. At most she has a stage I lymphoma and path will dictate the next step\par -she may need a bone marrow biopsy if this is follicular lymphoma\par -will send out LDH, Hep B and C, HIV, and serum immunoelectrophoresis and maybe ESR with  next Visit once path is back \par \par #BRCA negative\par -she will try to find the results of her tests\par -she is due for Mammogram this year states last one was about 1-2 years ago\par \par Thank  you

## 2021-02-10 NOTE — PHYSICAL EXAM
[Restricted in physically strenuous activity but ambulatory and able to carry out work of a light or sedentary nature] : Status 1- Restricted in physically strenuous activity but ambulatory and able to carry out work of a light or sedentary nature, e.g., light house work, office work [Normal] : PERRL, EOMI, no conjunctival infection, anicteric [de-identified] : she has a ROSI drain in left neck withy milky like dsicahrge

## 2021-02-10 NOTE — HISTORY OF PRESENT ILLNESS
[de-identified] : CC: Can my drain come out\par \par This is a 75 year old female  with history of colon cancer over 20 years ago, 2 sisters with  breast cancer ( she states she is BRCA negative) and  distant smoking history who saw Dr. Caldera due to horsiness of her voice which has been present for over a year and her son noticed it on the phone. She did not fell an enlarged node, have b symptoms, night sweats or weight loss. She is a bit overwhelmed  at this point. She was found to have an enlarged  node on work up\par \par She had the following work up:\par 8/26/20: Fiberoptic Laryngeal Endoscopy - Diagnostic\par Pre-op/post op indication: Dysphonia\par Patient was unable to cooperate with mirror. After informed verbal consent is obtained, the fiberoptic nasal endoscope # [ ] is passed via the right nasal cavity. \par Findings: base of tongue and vallecula clear, hypopharynx normal without pooled secretions, crisp epiglottis, no evidence of laryngomalacia, bilateral vocal fold mobility full and symmetric, no significant arytenoids edema , and no mass or lesions. Posterior erythema +++ \par \par US thyroid:  9/2020: Multiple thyroid nodules up to 1.0 cm above, which do not meet threshold for further follow-up per ACR guidelines. However, there are multiple abnormally enlarged left neck lymph nodes without clear fatty joana, measuring up to 2.5 cm. A neck CT with IV contrast is recommended for further evaluation.\par \par CT neck  Lobylated amss in the left supraclavicular region  3.4 cm x 1.3 cm \par \par She underwent an FNA  of the adenopathy on 1/13/2021:  Final Diagnosis\par LYMPH NODE, LEFT SIDED NECK,  LEVEL IV, U/S GUIDED FNA:\par - POSITIVE FOR MALIGNANT CELLS.\par - Atypical large lymphoid cells, consistent with malignant\par lymphoma (see comment).\par \par Comment:  Immunohistochemical stains show the atypical cells are\par positive for CD45, CD15, negative for CD30, AE1/3, cam 5.2. Flow\par cytometry identified a subpopulation of monotypic B cells,\par support the diagosis of B-cell lymphoma. See report of [id number] [id number] for details. Excisional biopsy for confirmation\par and further classification is recommended. Dr. Jett is notified.\par \par She underwent an excisional biopsy on  2/5/21 and resutls are pending. FLow is back :  DIAGNOSIS:\par Left level 4 lymph node:\par      - The lymphocyte immunophenotypic findings show no diagnostic abnormalities with a small subset of CD10 positive B-cells present (1.3% of total cells; polytypic), which may be seen in follicular hyperplasia.\par Please see interpretation.\par \par She had  a PET/CT today that showed: IMPRESSION:\par \par 1. Post left neck excisional biopsy with drainage catheter placement. Expected recent postoperative change within soft tissues of left neck with inflammatory stranding of soft tissues with increased radiotracer uptake.\par \par 2. Two FDG avid left lower posterior triangle/lateral supraclavicular lymph nodes, larger 0.8 x 0.8 cm with max SUV 11.2. Additional mildly FDG avid 1 x 0.7 cm left submandibular lymph node with max SUV 3.5. Findings are possibly reactive, and attention on follow-up PET/CT recommended.\par \par 3. No additional sites of abnormal FDG uptake.\par \par  \par She had blood work  on 2/4/2021 CBC was normal. CMP was normal except for bili of 1.3 byt his was normal in 1/21\par \par

## 2021-02-10 NOTE — CONSULT LETTER
[Dear  ___] : Dear  [unfilled], [Consult Letter:] : I had the pleasure of evaluating your patient, [unfilled]. [Please see my note below.] : Please see my note below. [Consult Closing:] : Thank you very much for allowing me to participate in the care of this patient.  If you have any questions, please do not hesitate to contact me. [Sincerely,] : Sincerely, [FreeTextEntry3] : Tani

## 2021-02-16 ENCOUNTER — APPOINTMENT (OUTPATIENT)
Dept: OTOLARYNGOLOGY | Facility: CLINIC | Age: 76
End: 2021-02-16
Payer: MEDICARE

## 2021-02-16 DIAGNOSIS — R22.1 LOCALIZED SWELLING, MASS AND LUMP, NECK: ICD-10-CM

## 2021-02-16 LAB — SURGICAL PATHOLOGY STUDY: SIGNIFICANT CHANGE UP

## 2021-02-16 PROCEDURE — 99072 ADDL SUPL MATRL&STAF TM PHE: CPT

## 2021-02-16 PROCEDURE — 99212 OFFICE O/P EST SF 10 MIN: CPT

## 2021-02-16 NOTE — REASON FOR VISIT
[Post-Operative Visit] : a post-operative visit [FreeTextEntry2] : excisional lymph node biopsy left neck 2/5/2021

## 2021-02-16 NOTE — PHYSICAL EXAM
[de-identified] : left neck incision c/d/i, ROSI in place with minimal translucent thin white drainage

## 2021-02-16 NOTE — HISTORY OF PRESENT ILLNESS
[de-identified] : Patient here today with c/o hoarseness. Patient admits it has been present for years. getting worse. gets worse sometimes. She feels her voice gets low at times. No dysphagia. No choking. H/o smoking quit 50 years ago. \par H/o reflux and PND, no meds now. She also c/o of a fb sensation in her throat and coughing up off white stones. \par \par ALso complains of bilateral nose dripping when she eats. \par \par 11/2/2020: Patient presents today following up on hoarseness. Patient saw Dr. Clancy in the past. Recently had thyroid sono and CT scan. Patient admits hoarseness persists. Not taking Omeprazole. She denies swelling in her neck. \par \par \par 01/22/2021: Patient returns today following up on neck mass , throat discomfort . Here to discuss FNA results, performed on 1/13/2020. Due to social situation, difficult for patient to have biopsy done sooner. FNA shows malignant lymphoma, excisional biopsy recommended. \par \par \par 2/8/21: Patient here s/p excisional lymph node biopsy left neck 2/5/21. Patient presents for drain removal. Doing well post-op. She emptied the drain twice since discharge 3 cc total of milky white fluid. \par \par 2/9/21: Patient here s/p excisional lymph node biopsy left neck 2/5/21. Continues to have milky fluid from ROSI drain, ~5cc from drain since yesterday morning.   [FreeTextEntry1] : \par 02/16/2021: Patient here s/p excisional lymph node biopsy left neck 2/5/21.

## 2021-02-16 NOTE — ASSESSMENT
[FreeTextEntry1] : healing well. Patient will f/u with Dr. Jett and call with any questions or concerns.

## 2021-02-18 DIAGNOSIS — Z85.038 PERSONAL HISTORY OF OTHER MALIGNANT NEOPLASM OF LARGE INTESTINE: ICD-10-CM

## 2021-02-18 DIAGNOSIS — Z88.0 ALLERGY STATUS TO PENICILLIN: ICD-10-CM

## 2021-02-18 DIAGNOSIS — Z86.718 PERSONAL HISTORY OF OTHER VENOUS THROMBOSIS AND EMBOLISM: ICD-10-CM

## 2021-02-18 DIAGNOSIS — E78.00 PURE HYPERCHOLESTEROLEMIA, UNSPECIFIED: ICD-10-CM

## 2021-02-18 DIAGNOSIS — Z87.891 PERSONAL HISTORY OF NICOTINE DEPENDENCE: ICD-10-CM

## 2021-02-18 DIAGNOSIS — R59.0 LOCALIZED ENLARGED LYMPH NODES: ICD-10-CM

## 2021-02-18 DIAGNOSIS — I10 ESSENTIAL (PRIMARY) HYPERTENSION: ICD-10-CM

## 2021-02-18 DIAGNOSIS — Z90.49 ACQUIRED ABSENCE OF OTHER SPECIFIED PARTS OF DIGESTIVE TRACT: ICD-10-CM

## 2021-02-18 DIAGNOSIS — M19.90 UNSPECIFIED OSTEOARTHRITIS, UNSPECIFIED SITE: ICD-10-CM

## 2021-02-18 DIAGNOSIS — Z85.828 PERSONAL HISTORY OF OTHER MALIGNANT NEOPLASM OF SKIN: ICD-10-CM

## 2021-02-18 DIAGNOSIS — C85.81 OTHER SPECIFIED TYPES OF NON-HODGKIN LYMPHOMA, LYMPH NODES OF HEAD, FACE, AND NECK: ICD-10-CM

## 2021-02-18 LAB — CHROM ANALY OVERALL INTERP SPEC-IMP: SIGNIFICANT CHANGE UP

## 2021-02-24 ENCOUNTER — OUTPATIENT (OUTPATIENT)
Dept: OUTPATIENT SERVICES | Facility: HOSPITAL | Age: 76
LOS: 1 days | Discharge: HOME | End: 2021-02-24

## 2021-02-24 ENCOUNTER — APPOINTMENT (OUTPATIENT)
Dept: HEMATOLOGY ONCOLOGY | Facility: CLINIC | Age: 76
End: 2021-02-24
Payer: MEDICARE

## 2021-02-24 VITALS
HEART RATE: 60 BPM | SYSTOLIC BLOOD PRESSURE: 142 MMHG | TEMPERATURE: 97.1 F | HEIGHT: 59 IN | DIASTOLIC BLOOD PRESSURE: 72 MMHG | RESPIRATION RATE: 14 BRPM | WEIGHT: 175 LBS | BODY MASS INDEX: 35.28 KG/M2

## 2021-02-24 DIAGNOSIS — C85.90 NON-HODGKIN LYMPHOMA, UNSPECIFIED, UNSPECIFIED SITE: ICD-10-CM

## 2021-02-24 DIAGNOSIS — T84.032A MECHANICAL LOOSENING OF INTERNAL RIGHT KNEE PROSTHETIC JOINT, INITIAL ENCOUNTER: Chronic | ICD-10-CM

## 2021-02-24 DIAGNOSIS — Z98.890 OTHER SPECIFIED POSTPROCEDURAL STATES: Chronic | ICD-10-CM

## 2021-02-24 DIAGNOSIS — Z96.652 PRESENCE OF LEFT ARTIFICIAL KNEE JOINT: Chronic | ICD-10-CM

## 2021-02-24 PROCEDURE — 99212 OFFICE O/P EST SF 10 MIN: CPT

## 2021-02-28 NOTE — ASSESSMENT
[FreeTextEntry1] : #LEft supraclavicular adenopathy with FNA suspicious for Lymphoma and s/p excisional biopsy that demonstrated follicular hyperplasia ( this is benight finding),  no B symptoms, PET/CT showed  likely only post surgical findings  and no adenopathy on exam today\par -reassurance provided and she will come back as needed \par \par #BRCA negative\par -she will try to find the results of her tests  but  did not bring them in today \par -she is due for Mammogram this year states last one was about 1-2 years ago and will follow up with her PCP\par \par Thank  you

## 2021-02-28 NOTE — HISTORY OF PRESENT ILLNESS
[de-identified] : CC: Can my drain come out\par \par This is a 75 year old female  with history of colon cancer over 20 years ago, 2 sisters with  breast cancer ( she states she is BRCA negative) and  distant smoking history who saw Dr. Caldera due to horsiness of her voice which has been present for over a year and her son noticed it on the phone. She did not fell an enlarged node, have b symptoms, night sweats or weight loss. She is a bit overwhelmed  at this point. She was found to have an enlarged  node on work up\par \par She had the following work up:\par 8/26/20: Fiberoptic Laryngeal Endoscopy - Diagnostic\par Pre-op/post op indication: Dysphonia\par Patient was unable to cooperate with mirror. After informed verbal consent is obtained, the fiberoptic nasal endoscope # [ ] is passed via the right nasal cavity. \par Findings: base of tongue and vallecula clear, hypopharynx normal without pooled secretions, crisp epiglottis, no evidence of laryngomalacia, bilateral vocal fold mobility full and symmetric, no significant arytenoids edema , and no mass or lesions. Posterior erythema +++ \par \par US thyroid:  9/2020: Multiple thyroid nodules up to 1.0 cm above, which do not meet threshold for further follow-up per ACR guidelines. However, there are multiple abnormally enlarged left neck lymph nodes without clear fatty joana, measuring up to 2.5 cm. A neck CT with IV contrast is recommended for further evaluation.\par \par CT neck  Lobylated amss in the left supraclavicular region  3.4 cm x 1.3 cm \par \par She underwent an FNA  of the adenopathy on 1/13/2021:  Final Diagnosis\par LYMPH NODE, LEFT SIDED NECK,  LEVEL IV, U/S GUIDED FNA:\par - POSITIVE FOR MALIGNANT CELLS.\par - Atypical large lymphoid cells, consistent with malignant\par lymphoma (see comment).\par \par Comment:  Immunohistochemical stains show the atypical cells are\par positive for CD45, CD15, negative for CD30, AE1/3, cam 5.2. Flow\par cytometry identified a subpopulation of monotypic B cells,\par support the diagosis of B-cell lymphoma. See report of [id number]-\par [id number] for details. Excisional biopsy for confirmation\par and further classification is recommended. Dr. Jett is notified.\par \par She underwent an excisional biopsy on  2/5/21 and resutls are pending. FLow is back :  DIAGNOSIS:\par Left level 4 lymph node:\par      - The lymphocyte immunophenotypic findings show no diagnostic abnormalities with a small subset of CD10 positive B-cells present (1.3% of total cells; polytypic), which may be seen in follicular hyperplasia.\par Please see interpretation.\par \par She had  a PET/CT today that showed: IMPRESSION:\par \par 1. Post left neck excisional biopsy with drainage catheter placement. Expected recent postoperative change within soft tissues of left neck with inflammatory stranding of soft tissues with increased radiotracer uptake.\par \par 2. Two FDG avid left lower posterior triangle/lateral supraclavicular lymph nodes, larger 0.8 x 0.8 cm with max SUV 11.2. Additional mildly FDG avid 1 x 0.7 cm left submandibular lymph node with max SUV 3.5. Findings are possibly reactive, and attention on follow-up PET/CT recommended.\par \par 3. No additional sites of abnormal FDG uptake.\par \par  \par She had blood work  on 2/4/2021 CBC was normal. CMP was normal except for bili of 1.3 byt his was normal in 1/21\par \par   [de-identified] : 2/24/21\par She is here for follow up. She is doing well.  Neck is healing well. Final path is benight. Follicular hyperplasia. \par \par Final Diagnosis\par Lymph nodes, left, level 4, excision:\par - Follicular lymphoid hyperplasia.\par - See comment.\par \par Comment:\par There is no morphologic or immunophenotypic evidence of\par lymphoproliferative disease in this biopsy. And there is no\par evidence of metastatic carcinoma. Clinical correlation is\par recommended. The result was reported to Dr. ALEXIS Boateng on\par 2/16/2021.\par \par Microscopic Description:\par Sections demonstrate intact lymph nodes architecture with patent\par peripheral sinuses. There is an increase in the number with\par variation in size and shape of the follicles. The follicles are\par present throughout the lymph nodes including the medulla. The\par hyperplastic follicles contain few mitosis and some tingible body\par macrophages with light and dark zones present. The mantle zone is\par intact. The paracortex demonstrates a polymorphous lymphoid\par population consisting of small lymphocytes, with occasional\par plasma cells, histiocytes, immunoblasts and granulocytes. There\par is no granuloma, necrosis, classical Jose G-Kym cells or\par variants are present. Background fibrosis is noted.\par \par Immunophenotyping:\par 1) Flow Cytometry ([facility], [id number]):\par Left level 4 lymph node:\par - The lymphocyte immunophenotypic findings show no\par diagnostic abnormalities with a small subset of CD10 positive B-\par cells present (1.3% of total cells; polytypic), which may be seen\par in follicular hyperplasia. Please see interpretation.\par \par INTERPRETATION:\par CYTOSPIN: Heterogeneous lymphocytes and occasional scattered\par larger cells; clusters of cells also present.\par \par IMMUNOPHENOTYPE: Lymphocytes (94% of cells): Heterogeneous\par population of T-cells (with normal CD4 to CD8 ratio) and\par polytypic B-cells with a small subset of CD10 positive B-cells\par present (1.3% of total cells; also polytypic).\par \par 2) Immunohistochemistry:\par Performed on block 1A - CD3, CD5, CD10, CD20, CD21, CD23, CD43,\par PAX-5, Cyclin D1, BCL-2, BCL-6, ,MUM-1,Ki-67 and AE1/AE3\par CD20 and PAX-5 positive B-cells are predominantly present in the\par follicles with the exception of a few in paracortical area,\par without aberrant expression\par \par \par \par \par \par POVEROMO, PANKAJ                       3\par \par \par \par Surgical Final Report\par \par \par \par \par of CD5 or CD43.  CD10 and BCL-6 highlight occasional germinal\par centers, which are also negative for BCL-2. CD21 and CD23\par highlight intact follicular dendritic cell meshwork in the\par germinal centers and a few CD23+ small lymphocytes, likely\par corresponding to the mantle cells of the follicles. Cyclin D1 is\par negative in lymphocytes. CD3, CD5, and CD43 are expressed in the\par majority of lymphocytes in the interfollicular zones and rare\par lymphoid follicles. MUM-1 stains very few plasma cells. Ki-67 is\par highly expressed in the germinal center B-cells and less than 5%\par of small lymphocytes. AE1/AE3 is\par negative.\par \par

## 2021-02-28 NOTE — PHYSICAL EXAM
[Restricted in physically strenuous activity but ambulatory and able to carry out work of a light or sedentary nature] : Status 1- Restricted in physically strenuous activity but ambulatory and able to carry out work of a light or sedentary nature, e.g., light house work, office work [Normal] : PERRL, EOMI, no conjunctival infection, anicteric [de-identified] : Excisional biopsy site healing well.

## 2021-10-08 ENCOUNTER — RESULT REVIEW (OUTPATIENT)
Age: 76
End: 2021-10-08

## 2023-01-19 NOTE — PATIENT PROFILE ADULT. - PURPOSEFUL PROACTIVE ROUNDING
Trauma - s/p fall down 12 stairs
Patient

## 2023-08-08 NOTE — PRE-ANESTHESIA EVALUATION ADULT - NSANTHASARD_GEN_ALL_CORE
3
FAMILY HISTORY:  Mother  Still living? Unknown  Family history- stomach cancer, Age at diagnosis: Age Unknown    Sibling  Still living? Unknown  Family history of throat cancer, Age at diagnosis: Age Unknown  FH: HTN (hypertension), Age at diagnosis: Age Unknown

## 2024-01-31 ENCOUNTER — OUTPATIENT (OUTPATIENT)
Dept: OUTPATIENT SERVICES | Facility: HOSPITAL | Age: 79
LOS: 1 days | Discharge: HOME | End: 2024-01-31
Payer: MEDICARE

## 2024-01-31 VITALS
TEMPERATURE: 97 F | SYSTOLIC BLOOD PRESSURE: 175 MMHG | HEART RATE: 65 BPM | RESPIRATION RATE: 17 BRPM | DIASTOLIC BLOOD PRESSURE: 86 MMHG | HEIGHT: 59 IN | WEIGHT: 175.05 LBS | OXYGEN SATURATION: 96 %

## 2024-01-31 VITALS — DIASTOLIC BLOOD PRESSURE: 84 MMHG | HEART RATE: 62 BPM | RESPIRATION RATE: 17 BRPM | SYSTOLIC BLOOD PRESSURE: 184 MMHG

## 2024-01-31 DIAGNOSIS — T84.032A MECHANICAL LOOSENING OF INTERNAL RIGHT KNEE PROSTHETIC JOINT, INITIAL ENCOUNTER: Chronic | ICD-10-CM

## 2024-01-31 DIAGNOSIS — Z96.653 PRESENCE OF ARTIFICIAL KNEE JOINT, BILATERAL: Chronic | ICD-10-CM

## 2024-01-31 DIAGNOSIS — Z98.890 OTHER SPECIFIED POSTPROCEDURAL STATES: Chronic | ICD-10-CM

## 2024-01-31 DIAGNOSIS — Z96.652 PRESENCE OF LEFT ARTIFICIAL KNEE JOINT: Chronic | ICD-10-CM

## 2024-01-31 DIAGNOSIS — H25.12 AGE-RELATED NUCLEAR CATARACT, LEFT EYE: ICD-10-CM

## 2024-01-31 DIAGNOSIS — H25.11 AGE-RELATED NUCLEAR CATARACT, RIGHT EYE: ICD-10-CM

## 2024-01-31 PROCEDURE — V2632: CPT

## 2024-01-31 RX ORDER — HYDROCHLOROTHIAZIDE 25 MG
1 TABLET ORAL
Qty: 0 | Refills: 0 | DISCHARGE

## 2024-01-31 RX ORDER — OMEGA-3 ACID ETHYL ESTERS 1 G
0 CAPSULE ORAL
Qty: 0 | Refills: 0 | DISCHARGE

## 2024-01-31 RX ORDER — ATORVASTATIN CALCIUM 80 MG/1
1 TABLET, FILM COATED ORAL
Qty: 0 | Refills: 0 | DISCHARGE

## 2024-01-31 NOTE — ASU PATIENT PROFILE, ADULT - NSICDXPASTSURGICALHX_GEN_ALL_CORE_FT
PAST SURGICAL HISTORY:  H/O total knee replacement, bilateral     H/O umbilical hernia repair 2001    History of colon resection 2002    History of incisional hernia repair 2003    History of knee replacement, total, left 1-2017    History of ventral hernia repair per pt my hernia became entangled in mesh  2-2017    Loose right total knee arthroplasty

## 2024-01-31 NOTE — ASU PATIENT PROFILE, ADULT - SUPPORT PERSON NAME
- Nondisplaced right intertrochanteric femur fracture, present on admission.  - Appreciate Orthopedic surgery evaluation, recommendations and interventions as noted.   - TXA administered in the ED.   - Patient status post ORIF of right femur fracture with IM nail insertion on 1/11/2024.  - May be weightbearing as tolerated on the right lower extremity postoperatively.  - Monitor right lower extremity neurovascular exam.  - Continue multimodal analgesic regimen.  - Continue DVT prophylaxis.  - PT and OT evaluation and treatment as indicated.  - Outpatient follow up with Orthopedic surgery for re-evaluation.   Nehal

## 2024-01-31 NOTE — ASU PATIENT PROFILE, ADULT - FALL HARM RISK - HARM RISK INTERVENTIONS

## 2024-02-03 DIAGNOSIS — M19.90 UNSPECIFIED OSTEOARTHRITIS, UNSPECIFIED SITE: ICD-10-CM

## 2024-02-03 DIAGNOSIS — I10 ESSENTIAL (PRIMARY) HYPERTENSION: ICD-10-CM

## 2024-02-03 DIAGNOSIS — Z85.828 PERSONAL HISTORY OF OTHER MALIGNANT NEOPLASM OF SKIN: ICD-10-CM

## 2024-02-03 DIAGNOSIS — Z79.01 LONG TERM (CURRENT) USE OF ANTICOAGULANTS: ICD-10-CM

## 2024-02-03 DIAGNOSIS — E78.00 PURE HYPERCHOLESTEROLEMIA, UNSPECIFIED: ICD-10-CM

## 2024-02-03 DIAGNOSIS — H26.8 OTHER SPECIFIED CATARACT: ICD-10-CM

## 2024-02-03 DIAGNOSIS — Z86.718 PERSONAL HISTORY OF OTHER VENOUS THROMBOSIS AND EMBOLISM: ICD-10-CM

## 2024-02-03 DIAGNOSIS — Z90.49 ACQUIRED ABSENCE OF OTHER SPECIFIED PARTS OF DIGESTIVE TRACT: ICD-10-CM

## 2024-02-03 DIAGNOSIS — Z85.038 PERSONAL HISTORY OF OTHER MALIGNANT NEOPLASM OF LARGE INTESTINE: ICD-10-CM

## 2024-02-03 DIAGNOSIS — Z88.0 ALLERGY STATUS TO PENICILLIN: ICD-10-CM

## 2024-02-03 DIAGNOSIS — Z96.653 PRESENCE OF ARTIFICIAL KNEE JOINT, BILATERAL: ICD-10-CM

## 2024-10-30 ENCOUNTER — INPATIENT (INPATIENT)
Facility: HOSPITAL | Age: 79
LOS: 1 days | Discharge: HOME | DRG: 206 | End: 2024-11-01
Attending: INTERNAL MEDICINE | Admitting: INTERNAL MEDICINE
Payer: MEDICARE

## 2024-10-30 VITALS
TEMPERATURE: 100 F | WEIGHT: 169.98 LBS | DIASTOLIC BLOOD PRESSURE: 93 MMHG | SYSTOLIC BLOOD PRESSURE: 174 MMHG | OXYGEN SATURATION: 94 % | RESPIRATION RATE: 19 BRPM | HEART RATE: 90 BPM | HEIGHT: 59 IN

## 2024-10-30 DIAGNOSIS — Z98.890 OTHER SPECIFIED POSTPROCEDURAL STATES: Chronic | ICD-10-CM

## 2024-10-30 DIAGNOSIS — T84.032A MECHANICAL LOOSENING OF INTERNAL RIGHT KNEE PROSTHETIC JOINT, INITIAL ENCOUNTER: Chronic | ICD-10-CM

## 2024-10-30 DIAGNOSIS — Z96.653 PRESENCE OF ARTIFICIAL KNEE JOINT, BILATERAL: Chronic | ICD-10-CM

## 2024-10-30 DIAGNOSIS — R06.02 SHORTNESS OF BREATH: ICD-10-CM

## 2024-10-30 DIAGNOSIS — Z96.652 PRESENCE OF LEFT ARTIFICIAL KNEE JOINT: Chronic | ICD-10-CM

## 2024-10-30 LAB
ALBUMIN SERPL ELPH-MCNC: 4.4 G/DL — SIGNIFICANT CHANGE UP (ref 3.5–5.2)
ALP SERPL-CCNC: 90 U/L — SIGNIFICANT CHANGE UP (ref 30–115)
ALT FLD-CCNC: 15 U/L — SIGNIFICANT CHANGE UP (ref 0–41)
ANION GAP SERPL CALC-SCNC: 12 MMOL/L — SIGNIFICANT CHANGE UP (ref 7–14)
AST SERPL-CCNC: 22 U/L — SIGNIFICANT CHANGE UP (ref 0–41)
BASOPHILS # BLD AUTO: 0.04 K/UL — SIGNIFICANT CHANGE UP (ref 0–0.2)
BASOPHILS NFR BLD AUTO: 0.4 % — SIGNIFICANT CHANGE UP (ref 0–1)
BILIRUB SERPL-MCNC: 1.2 MG/DL — SIGNIFICANT CHANGE UP (ref 0.2–1.2)
BUN SERPL-MCNC: 19 MG/DL — SIGNIFICANT CHANGE UP (ref 10–20)
CALCIUM SERPL-MCNC: 9.8 MG/DL — SIGNIFICANT CHANGE UP (ref 8.4–10.5)
CHLORIDE SERPL-SCNC: 104 MMOL/L — SIGNIFICANT CHANGE UP (ref 98–110)
CO2 SERPL-SCNC: 26 MMOL/L — SIGNIFICANT CHANGE UP (ref 17–32)
CREAT SERPL-MCNC: 0.9 MG/DL — SIGNIFICANT CHANGE UP (ref 0.7–1.5)
CRP SERPL-MCNC: 9.5 MG/L — HIGH
EGFR: 65 ML/MIN/1.73M2 — SIGNIFICANT CHANGE UP
EOSINOPHIL # BLD AUTO: 0 K/UL — SIGNIFICANT CHANGE UP (ref 0–0.7)
EOSINOPHIL NFR BLD AUTO: 0 % — SIGNIFICANT CHANGE UP (ref 0–8)
ERYTHROCYTE [SEDIMENTATION RATE] IN BLOOD: 25 MM/HR — HIGH (ref 0–20)
FLUAV AG NPH QL: SIGNIFICANT CHANGE UP
FLUBV AG NPH QL: SIGNIFICANT CHANGE UP
GAS PNL BLDV: SIGNIFICANT CHANGE UP
GLUCOSE SERPL-MCNC: 140 MG/DL — HIGH (ref 70–99)
HCT VFR BLD CALC: 40.3 % — SIGNIFICANT CHANGE UP (ref 37–47)
HGB BLD-MCNC: 13.9 G/DL — SIGNIFICANT CHANGE UP (ref 12–16)
IMM GRANULOCYTES NFR BLD AUTO: 0.5 % — HIGH (ref 0.1–0.3)
LYMPHOCYTES # BLD AUTO: 1.03 K/UL — LOW (ref 1.2–3.4)
LYMPHOCYTES # BLD AUTO: 10 % — LOW (ref 20.5–51.1)
MCHC RBC-ENTMCNC: 30.7 PG — SIGNIFICANT CHANGE UP (ref 27–31)
MCHC RBC-ENTMCNC: 34.5 G/DL — SIGNIFICANT CHANGE UP (ref 32–37)
MCV RBC AUTO: 89 FL — SIGNIFICANT CHANGE UP (ref 81–99)
MONOCYTES # BLD AUTO: 0.82 K/UL — HIGH (ref 0.1–0.6)
MONOCYTES NFR BLD AUTO: 7.9 % — SIGNIFICANT CHANGE UP (ref 1.7–9.3)
NEUTROPHILS # BLD AUTO: 8.4 K/UL — HIGH (ref 1.4–6.5)
NEUTROPHILS NFR BLD AUTO: 81.2 % — HIGH (ref 42.2–75.2)
NRBC # BLD: 0 /100 WBCS — SIGNIFICANT CHANGE UP (ref 0–0)
NT-PROBNP SERPL-SCNC: 837 PG/ML — HIGH (ref 0–300)
PLATELET # BLD AUTO: 166 K/UL — SIGNIFICANT CHANGE UP (ref 130–400)
PMV BLD: 11.2 FL — HIGH (ref 7.4–10.4)
POTASSIUM SERPL-MCNC: 4.3 MMOL/L — SIGNIFICANT CHANGE UP (ref 3.5–5)
POTASSIUM SERPL-SCNC: 4.3 MMOL/L — SIGNIFICANT CHANGE UP (ref 3.5–5)
PROT SERPL-MCNC: 6.6 G/DL — SIGNIFICANT CHANGE UP (ref 6–8)
RBC # BLD: 4.53 M/UL — SIGNIFICANT CHANGE UP (ref 4.2–5.4)
RBC # FLD: 12.2 % — SIGNIFICANT CHANGE UP (ref 11.5–14.5)
RSV RNA NPH QL NAA+NON-PROBE: SIGNIFICANT CHANGE UP
SARS-COV-2 RNA SPEC QL NAA+PROBE: SIGNIFICANT CHANGE UP
SODIUM SERPL-SCNC: 142 MMOL/L — SIGNIFICANT CHANGE UP (ref 135–146)
TROPONIN T, HIGH SENSITIVITY RESULT: 15 NG/L — HIGH (ref 6–13)
TROPONIN T, HIGH SENSITIVITY RESULT: 15 NG/L — HIGH (ref 6–13)
TROPONIN T, HIGH SENSITIVITY RESULT: 18 NG/L — HIGH (ref 6–13)
WBC # BLD: 10.34 K/UL — SIGNIFICANT CHANGE UP (ref 4.8–10.8)
WBC # FLD AUTO: 10.34 K/UL — SIGNIFICANT CHANGE UP (ref 4.8–10.8)

## 2024-10-30 PROCEDURE — 84484 ASSAY OF TROPONIN QUANT: CPT

## 2024-10-30 PROCEDURE — 74174 CTA ABD&PLVS W/CONTRAST: CPT | Mod: 26,MC

## 2024-10-30 PROCEDURE — 99285 EMERGENCY DEPT VISIT HI MDM: CPT

## 2024-10-30 PROCEDURE — G0378: CPT

## 2024-10-30 PROCEDURE — 99223 1ST HOSP IP/OBS HIGH 75: CPT

## 2024-10-30 PROCEDURE — 93005 ELECTROCARDIOGRAM TRACING: CPT

## 2024-10-30 PROCEDURE — 86140 C-REACTIVE PROTEIN: CPT

## 2024-10-30 PROCEDURE — 86431 RHEUMATOID FACTOR QUANT: CPT

## 2024-10-30 PROCEDURE — 86038 ANTINUCLEAR ANTIBODIES: CPT

## 2024-10-30 PROCEDURE — 84145 PROCALCITONIN (PCT): CPT

## 2024-10-30 PROCEDURE — 93010 ELECTROCARDIOGRAM REPORT: CPT

## 2024-10-30 PROCEDURE — 71275 CT ANGIOGRAPHY CHEST: CPT | Mod: 26,MC

## 2024-10-30 PROCEDURE — 71045 X-RAY EXAM CHEST 1 VIEW: CPT | Mod: 26

## 2024-10-30 PROCEDURE — 85027 COMPLETE CBC AUTOMATED: CPT

## 2024-10-30 PROCEDURE — 36415 COLL VENOUS BLD VENIPUNCTURE: CPT

## 2024-10-30 PROCEDURE — 80048 BASIC METABOLIC PNL TOTAL CA: CPT

## 2024-10-30 PROCEDURE — 85652 RBC SED RATE AUTOMATED: CPT

## 2024-10-30 PROCEDURE — 86225 DNA ANTIBODY NATIVE: CPT

## 2024-10-30 RX ORDER — KETOROLAC TROMETHAMINE 30 MG/ML
15 INJECTION INTRAMUSCULAR; INTRAVENOUS EVERY 6 HOURS
Refills: 0 | Status: DISCONTINUED | OUTPATIENT
Start: 2024-10-30 | End: 2024-10-30

## 2024-10-30 RX ORDER — IBUPROFEN 200 MG
400 TABLET ORAL THREE TIMES A DAY
Refills: 0 | Status: DISCONTINUED | OUTPATIENT
Start: 2024-10-30 | End: 2024-11-01

## 2024-10-30 RX ORDER — ACETAMINOPHEN 500 MG
650 TABLET ORAL EVERY 6 HOURS
Refills: 0 | Status: DISCONTINUED | OUTPATIENT
Start: 2024-10-30 | End: 2024-11-01

## 2024-10-30 RX ORDER — TRAMADOL HYDROCHLORIDE 50 MG/1
50 TABLET, COATED ORAL EVERY 6 HOURS
Refills: 0 | Status: DISCONTINUED | OUTPATIENT
Start: 2024-10-30 | End: 2024-11-01

## 2024-10-30 RX ORDER — LIDOCAINE HYDROCHLORIDE 40 MG/ML
2 SOLUTION TOPICAL EVERY 24 HOURS
Refills: 0 | Status: DISCONTINUED | OUTPATIENT
Start: 2024-10-30 | End: 2024-11-01

## 2024-10-30 RX ORDER — MORPHINE SULFATE 30 MG/1
4 TABLET, EXTENDED RELEASE ORAL ONCE
Refills: 0 | Status: DISCONTINUED | OUTPATIENT
Start: 2024-10-30 | End: 2024-10-30

## 2024-10-30 RX ORDER — PANTOPRAZOLE SODIUM 40 MG/1
40 TABLET, DELAYED RELEASE ORAL
Refills: 0 | Status: DISCONTINUED | OUTPATIENT
Start: 2024-10-30 | End: 2024-11-01

## 2024-10-30 RX ORDER — ACETAMINOPHEN 500 MG
650 TABLET ORAL ONCE
Refills: 0 | Status: COMPLETED | OUTPATIENT
Start: 2024-10-30 | End: 2024-10-30

## 2024-10-30 RX ORDER — ENOXAPARIN SODIUM 40MG/0.4ML
40 SYRINGE (ML) SUBCUTANEOUS EVERY 24 HOURS
Refills: 0 | Status: DISCONTINUED | OUTPATIENT
Start: 2024-10-30 | End: 2024-11-01

## 2024-10-30 RX ADMIN — Medication 650 MILLIGRAM(S): at 06:52

## 2024-10-30 RX ADMIN — LIDOCAINE HYDROCHLORIDE 2 PATCH: 40 SOLUTION TOPICAL at 17:45

## 2024-10-30 RX ADMIN — Medication 650 MILLIGRAM(S): at 11:05

## 2024-10-30 RX ADMIN — Medication 1000 MILLILITER(S): at 06:52

## 2024-10-30 RX ADMIN — Medication 400 MILLIGRAM(S): at 21:50

## 2024-10-30 NOTE — PATIENT PROFILE ADULT - FUNCTIONAL ASSESSMENT - BASIC MOBILITY 6.
Acitretin Counseling:  I discussed with the patient the risks of acitretin including but not limited to hair loss, dry lips/skin/eyes, liver damage, hyperlipidemia, depression/suicidal ideation, photosensitivity.  Serious rare side effects can include but are not limited to pancreatitis, pseudotumor cerebri, bony changes, clot formation/stroke/heart attack.  Patient understands that alcohol is contraindicated since it can result in liver toxicity and significantly prolong the elimination of the drug by many years. 3 = A little assistance

## 2024-10-30 NOTE — H&P ADULT - ASSESSMENT
78 yo F w pmh HTN, HLD is presenting today for evaluation of CP.    plz confirm med rec w son, tried w no response    #costochondritis vs pleuritis vs PNA?  CTA CAP evident for Scattered areas nodularity within the right lung  isolated episode of fever  does not report cough or fever at home  sp one dose levofloxacin  - procal / ESR / CRP  - will monitor off abx   - lido patch + toradol 15 q6 sev pain + tramadol 50 q6 mod pain + tylenol mild pain  - fu BCx    dvt ppx: lovenox  gi ppx: protonix  diet: regular  activity: AAT

## 2024-10-30 NOTE — H&P ADULT - ATTENDING COMMENTS
80 yo F w pmh HTN, HLD is presenting today for evaluation of CP.    Pleuritic chest pain- costochondritis. unlikely pericarditis. don't suspect cardiac etiology  CT chest reviewed other than multiple nodules no signs of pericardial effusion or pleural effusion.  BNP elevated with cardiomegaly noted. Trops stable, EKG unremarkable. patient on exam is euvolemic so doubt chf  Repeat trop and ekg  ESR and CRP mildly elevated - patient does have joint deformities and pleuritic chest pain and malar rash - lupus is on differential   Can send EDUARDO, DsDna and RF as baseline workup   For now would just treat with Tylenol and Motrin for the chest pain   For pulmonary nodules this can be followed up outpatient - given that the nodules are less than 6mm can repeat imaging in 12 months

## 2024-10-30 NOTE — ED PROVIDER NOTE - PROGRESS NOTE DETAILS
EP: Patient was endorsed to me by Dr. Mendoza to follow-up labs, imaging, reassess and dispo. 79-year-old female with right-sided pleuritic chest pain.  Patient reports URI-like symptoms for the past week, developed right-sided pain since yesterday morning, got worse last night which prompted ED visit this morning.  While in ED patient was found to have fever.  No nausea or vomiting, no abdominal pain, leg pain or swelling.  No recent travel trauma. Patient appears uncomfortable, but in no acute distress, speaking full sentences, normal work of breathing, auscultation somewhat limited due to poor inspiration, but breath sounds audible bilaterally,  no overlying skin rash, well-perfused extremities, distal pulses intact, no abdominal tenderness, no CVA TTP, no pitting leg edema or unilateral calf swelling. Patient was given Tylenol and fluids.  Will add antibiotics for suspected pneumonia given fever.  Imaging is pending.  Continue observation. CTA no dissection, scattered pulmonary nodules of which I discussed with patient. She reported she did not have prior knowledge of them. Previous smoking history for a short period as a teenager only. pt reports still uncomfortable to breathe. Swab and cultures pending, will admit.

## 2024-10-30 NOTE — ED PROVIDER NOTE - OBJECTIVE STATEMENT
Patient is a 79-year-old female PMH HTN, HLD, who presents ED with complaints of mid chest pain radiating to the arm neck and back that began yesterday morning and has significantly worsened in intensity, exacerbated with deep inspiration.  Patient reports feeling like she cannot catch her breath.  Endorses chills and subjective fever, denies sore throat, nausea, vomiting, abdominal pain, lower extremity swelling. Patient is a 79-year-old female PMH HTN, HLD, who presents ED with complaints of mid chest pain radiating to the arm neck and back that began yesterday morning and has significantly worsened in intensity, exacerbated with deep inspiration.  Patient reports feeling like she cannot catch her breath.  Endorses chills and subjective fever, denies sore throat, nausea, vomiting, abdominal pain, lower extremity swelling, paresthesias, or syncope.

## 2024-10-30 NOTE — PATIENT PROFILE ADULT - FALL HARM RISK - UNIVERSAL INTERVENTIONS
Bed in lowest position, wheels locked, appropriate side rails in place/Call bell, personal items and telephone in reach/Instruct patient to call for assistance before getting out of bed or chair/Non-slip footwear when patient is out of bed/[place] to call system/Physically safe environment - no spills, clutter or unnecessary equipment/Purposeful Proactive Rounding/Room/bathroom lighting operational, light cord in reach

## 2024-10-30 NOTE — ED PROVIDER NOTE - CARE PLAN
Principal Discharge DX:	Shortness of breath  Secondary Diagnosis:	Fever  Secondary Diagnosis:	Chest pain   1

## 2024-10-30 NOTE — H&P ADULT - NSHPPHYSICALEXAM_GEN_ALL_CORE
Gen: NAD  HEENT: PERRL, EOMI, mouth clr, nose clr  Neck: no nodes, no JVD, thyroid nl  chest: pain on palpation of breast bone and on either sides of the breast bone  lungs: clr  hrt: s1 s2 rrr no murmur  abd: soft, NT/ND, no HS megaly  ext: no edema, no c/c  neuro: aa ox3, cn intact, can move all 4 ext Gen: NAD  HEENT:malar rash noted   chest: pain on palpation of breast bone and on either sides of the breast bone  lungs: clr  hrt: s1 s2 rrr no murmur  abd: soft, NT/ND, no HS megaly  ext: significant nodules/swelling of the fingers and joints no pain of stiffness. ulnar deviation is noted b/l   neuro: aa ox3, cn intact, can move all 4 ext

## 2024-10-30 NOTE — H&P ADULT - HISTORY OF PRESENT ILLNESS
80 yo F w pmh HTN, HLD is presenting today for evaluation of CP.  Of note pt started experiencing midsternal cp yesterday night, says she slept weird on the couch and attributes it to it. Says overnight the cp got worse, eventually developed sob and this prompted her to come to the ED.   says she does not sleep a lot, hence yawns a lot, and the act causes pain. The chest is mre on deep inhalation and is preventing her from taking deep breaths.  Denies any prior episodes like this, denies fever or cough at home.    In ED, Sat 98 on 2L at encounter, one episode of fever to 101.9  trops 15 => 15  bnp 837    CTA CAP:  No evidence of aortic dissection. Atherosclerotic changes of the aorta   and its branches.    Scattered areas nodularity within the right lung. Follow-up according to   Fleischner Society guidelines.    Chronic/degenerative changes

## 2024-10-30 NOTE — ED PROVIDER NOTE - PHYSICAL EXAMINATION
VITAL SIGNS: I have reviewed nursing notes and confirm.  CONSTITUTIONAL: Visibly uncomfortable.   SKIN: Skin exam is warm and dry  HEAD: Normocephalic; atraumatic.  EYES: PERRL, EOM intact; conjunctiva and sclera clear.  ENT: No nasal discharge; airway clear.   NECK: Supple; non tender.  CARD: S1, S2; Regular rate and rhythm. Minimal chest wall tenderness.   RESP: Equal air entry BL. Speaking in full sentences.   ABD: Normal bowel sounds; soft; non-distended; non-tender; No rebound or guarding. No CVA tenderness.  EXT: Normal ROM. No tenderness to the extremities. No LE edema.   NEURO: Alert, oriented. Grossly unremarkable. No focal deficits.

## 2024-10-30 NOTE — ED ADULT NURSE NOTE - NSFALLUNIVINTERV_ED_ALL_ED
Bed/Stretcher in lowest position, wheels locked, appropriate side rails in place/Call bell, personal items and telephone in reach/Instruct patient to call for assistance before getting out of bed/chair/stretcher/Non-slip footwear applied when patient is off stretcher/[place] to call system/Physically safe environment - no spills, clutter or unnecessary equipment/Purposeful proactive rounding/Room/bathroom lighting operational, light cord in reach

## 2024-10-30 NOTE — ED PROVIDER NOTE - CLINICAL SUMMARY MEDICAL DECISION MAKING FREE TEXT BOX
79-year-old female with right-sided chest pain since last night.  Patient was febrile in ED.  Chest x-ray and CTA chest appear negative for acute pathology.  Normal lactate.  Patient was given antipyretics, fluids, dose of antibiotics, admitted for further management and care.

## 2024-10-31 LAB
ANION GAP SERPL CALC-SCNC: 13 MMOL/L — SIGNIFICANT CHANGE UP (ref 7–14)
ANION GAP SERPL CALC-SCNC: 14 MMOL/L — SIGNIFICANT CHANGE UP (ref 7–14)
BUN SERPL-MCNC: 22 MG/DL — HIGH (ref 10–20)
BUN SERPL-MCNC: 24 MG/DL — HIGH (ref 10–20)
CALCIUM SERPL-MCNC: 9 MG/DL — SIGNIFICANT CHANGE UP (ref 8.4–10.4)
CALCIUM SERPL-MCNC: 9.6 MG/DL — SIGNIFICANT CHANGE UP (ref 8.4–10.5)
CHLORIDE SERPL-SCNC: 105 MMOL/L — SIGNIFICANT CHANGE UP (ref 98–110)
CHLORIDE SERPL-SCNC: 105 MMOL/L — SIGNIFICANT CHANGE UP (ref 98–110)
CO2 SERPL-SCNC: 24 MMOL/L — SIGNIFICANT CHANGE UP (ref 17–32)
CO2 SERPL-SCNC: 24 MMOL/L — SIGNIFICANT CHANGE UP (ref 17–32)
CREAT SERPL-MCNC: 0.9 MG/DL — SIGNIFICANT CHANGE UP (ref 0.7–1.5)
CREAT SERPL-MCNC: 1 MG/DL — SIGNIFICANT CHANGE UP (ref 0.7–1.5)
EGFR: 58 ML/MIN/1.73M2 — LOW
EGFR: 65 ML/MIN/1.73M2 — SIGNIFICANT CHANGE UP
GLUCOSE SERPL-MCNC: 147 MG/DL — HIGH (ref 70–99)
GLUCOSE SERPL-MCNC: 88 MG/DL — SIGNIFICANT CHANGE UP (ref 70–99)
HCT VFR BLD CALC: 37.4 % — SIGNIFICANT CHANGE UP (ref 37–47)
HGB BLD-MCNC: 12.4 G/DL — SIGNIFICANT CHANGE UP (ref 12–16)
MCHC RBC-ENTMCNC: 30.3 PG — SIGNIFICANT CHANGE UP (ref 27–31)
MCHC RBC-ENTMCNC: 33.2 G/DL — SIGNIFICANT CHANGE UP (ref 32–37)
MCV RBC AUTO: 91.4 FL — SIGNIFICANT CHANGE UP (ref 81–99)
NRBC # BLD: 0 /100 WBCS — SIGNIFICANT CHANGE UP (ref 0–0)
PLATELET # BLD AUTO: 121 K/UL — LOW (ref 130–400)
PMV BLD: 11.5 FL — HIGH (ref 7.4–10.4)
POTASSIUM SERPL-MCNC: 3.8 MMOL/L — SIGNIFICANT CHANGE UP (ref 3.5–5)
POTASSIUM SERPL-MCNC: 4.3 MMOL/L — SIGNIFICANT CHANGE UP (ref 3.5–5)
POTASSIUM SERPL-SCNC: 3.8 MMOL/L — SIGNIFICANT CHANGE UP (ref 3.5–5)
POTASSIUM SERPL-SCNC: 4.3 MMOL/L — SIGNIFICANT CHANGE UP (ref 3.5–5)
PROCALCITONIN SERPL-MCNC: 0.07 NG/ML — SIGNIFICANT CHANGE UP (ref 0.02–0.1)
RBC # BLD: 4.09 M/UL — LOW (ref 4.2–5.4)
RBC # FLD: 12.7 % — SIGNIFICANT CHANGE UP (ref 11.5–14.5)
SODIUM SERPL-SCNC: 142 MMOL/L — SIGNIFICANT CHANGE UP (ref 135–146)
SODIUM SERPL-SCNC: 143 MMOL/L — SIGNIFICANT CHANGE UP (ref 135–146)
WBC # BLD: 6.99 K/UL — SIGNIFICANT CHANGE UP (ref 4.8–10.8)
WBC # FLD AUTO: 6.99 K/UL — SIGNIFICANT CHANGE UP (ref 4.8–10.8)

## 2024-10-31 PROCEDURE — 99232 SBSQ HOSP IP/OBS MODERATE 35: CPT

## 2024-10-31 RX ORDER — LOSARTAN POTASSIUM 25 MG/1
1 TABLET ORAL
Refills: 0 | DISCHARGE

## 2024-10-31 RX ORDER — DONEPEZIL HYDROCHLORIDE 23 MG/1
5 TABLET ORAL AT BEDTIME
Refills: 0 | Status: DISCONTINUED | OUTPATIENT
Start: 2024-10-31 | End: 2024-11-01

## 2024-10-31 RX ORDER — DONEPEZIL HYDROCHLORIDE 23 MG/1
1 TABLET ORAL
Refills: 0 | DISCHARGE

## 2024-10-31 RX ORDER — LOSARTAN POTASSIUM 25 MG/1
50 TABLET ORAL DAILY
Refills: 0 | Status: DISCONTINUED | OUTPATIENT
Start: 2024-10-31 | End: 2024-11-01

## 2024-10-31 RX ADMIN — PANTOPRAZOLE SODIUM 40 MILLIGRAM(S): 40 TABLET, DELAYED RELEASE ORAL at 05:25

## 2024-10-31 RX ADMIN — LOSARTAN POTASSIUM 50 MILLIGRAM(S): 25 TABLET ORAL at 13:10

## 2024-10-31 RX ADMIN — LIDOCAINE HYDROCHLORIDE 2 PATCH: 40 SOLUTION TOPICAL at 18:13

## 2024-10-31 NOTE — PROGRESS NOTE ADULT - ASSESSMENT
Assessment    80 yo F w pmh HTN, HLD is presented to the ED for pleuritic chest pain. Imaging performed and significant for scattered areas of nodularity within the right lung. Likely 2/2 to MSK causes.      #costochondritis  #Muscle strain  -CTA CAP evident for Scattered areas nodularity within the right lung  -isolated episode of fever  -does not report cough or fever at home  -trop 15>18  - ESR 25/ CRP 9.5  -procal wnl  - was given on dose of in ED levofloxacin  - will monitor off abx   - lido patch + toradol 15 q6 sev pain + tramadol 50 q6 mod pain + tylenol mild pain  -BCx specimen received    dvt ppx: lovenox  gi ppx: protonix  diet: regular  activity: AAT    In progress: ambulatory pox

## 2024-10-31 NOTE — PROGRESS NOTE ADULT - SUBJECTIVE AND OBJECTIVE BOX
PANKAJ THOMAS  79y Female    INTERVAL HPI/OVERNIGHT EVENTS:    pt still with right sided chest and abdominal discomfort - pleuritic and feels she can not take a full breath yet.  she feels better than yesterday  no SOB, fever, cough, N/V, previous episode  confirmed with the pt that symptoms began when she was lying on that side with her puppy the previous day    T(F): 98.2 (10-31-24 @ 12:40), Max: 98.8 (10-30-24 @ 21:15)  HR: 68 (10-31-24 @ 12:40) (64 - 76)  BP: 116/75 (10-31-24 @ 12:40) (110/70 - 125/80)  RR: 18 (10-31-24 @ 12:40) (18 - 18)  SpO2: 95% (10-31-24 @ 12:40) (95% - 98%)    PHYSICAL EXAM:  GENERAL: NAD  HEAD:  Normocephalic  EYES:  conjunctiva and sclera clear  ENMT: Moist mucous membranes  NERVOUS SYSTEM:  Alert, awake, Good concentration  CHEST/LUNG: CTA b/l; No rales, rhonchi, wheezing  HEART: Regular rate and rhythm  no tenderness or rash of right chest or abdomen  ABDOMEN: Soft, Nontender, Nondistended  EXTREMITIES:   No edema LE  SKIN: warm, dry    Consultant(s) Notes Reviewed:  [x ] YES  [ ] NO  Care Discussed with Consultants/Other Providers [ x] YES  [ ] NO    MEDICATIONS  (STANDING):  donepezil 5 milliGRAM(s) Oral at bedtime  enoxaparin Injectable 40 milliGRAM(s) SubCutaneous every 24 hours  hydrochlorothiazide 12.5 milliGRAM(s) Oral daily  lidocaine   4% Patch 2 Patch Transdermal every 24 hours  losartan 50 milliGRAM(s) Oral daily  pantoprazole    Tablet 40 milliGRAM(s) Oral before breakfast    MEDICATIONS  (PRN):  acetaminophen     Tablet .. 650 milliGRAM(s) Oral every 6 hours PRN Mild Pain (1 - 3)  ibuprofen  Tablet. 400 milliGRAM(s) Oral three times a day PRN Mild Pain (1 - 3)  traMADol 50 milliGRAM(s) Oral every 6 hours PRN Moderate Pain (4 - 6)      LABS:                        12.4   6.99  )-----------( 121      ( 31 Oct 2024 07:41 )             37.4     10-31    142  |  105  |  22[H]  ----------------------------<  88  4.3   |  24  |  0.9    Ca    9.0      31 Oct 2024 07:41    TPro  6.6  /  Alb  4.4  /  TBili  1.2  /  DBili  x   /  AST  22  /  ALT  15  /  AlkPhos  90  10-30                RADIOLOGY & ADDITIONAL TESTS:    Imaging or report Personally Reviewed:  [x ] YES  [ ] NO    < from: Xray Chest 1 View- PORTABLE-Urgent (10.30.24 @ 08:13) >    Impression:      No acute pulmonary process    < end of copied text >      < from: CT Angio Abdomen and Pelvis w/ IV Cont (10.30.24 @ 07:59) >  IMPRESSION:  No evidence of aortic dissection. Atherosclerotic changes of the aorta   and its branches.    Scattered areas nodularity within the right lung. Follow-up according to   Fleischner Society guidelines.    Chronic/degenerative changes as described above.      < end of copied text >     PANKAJ THOMAS  79y Female    INTERVAL HPI/OVERNIGHT EVENTS:    pt still with right sided chest and abdominal discomfort - pleuritic and feels she can not take a full breath yet.  she feels better than yesterday  no SOB, fever, cough, N/V, previous episode  confirmed with the pt that symptoms began when she was lying on that side with her puppy the previous day    T(F): 98.2 (10-31-24 @ 12:40), Max: 98.8 (10-30-24 @ 21:15)  HR: 68 (10-31-24 @ 12:40) (64 - 76)  BP: 116/75 (10-31-24 @ 12:40) (110/70 - 125/80)  RR: 18 (10-31-24 @ 12:40) (18 - 18)  SpO2: 95% (10-31-24 @ 12:40) (95% - 98%)    PHYSICAL EXAM:  GENERAL: NAD  HEAD:  Normocephalic  EYES:  conjunctiva and sclera clear  ENMT: Moist mucous membranes  NERVOUS SYSTEM:  Alert, awake, Good concentration  CHEST/LUNG: CTA b/l; No rales, rhonchi, wheezing  HEART: Regular rate and rhythm  no tenderness or rash of right chest or abdomen  ABDOMEN: Soft, Nontender, Nondistended  EXTREMITIES:   No edema LE  SKIN: warm, dry    Consultant(s) Notes Reviewed:  [x ] YES  [ ] NO  Care Discussed with Consultants/Other Providers [ x] YES  [ ] NO    MEDICATIONS  (STANDING):  donepezil 5 milliGRAM(s) Oral at bedtime  enoxaparin Injectable 40 milliGRAM(s) SubCutaneous every 24 hours  hydrochlorothiazide 12.5 milliGRAM(s) Oral daily  lidocaine   4% Patch 2 Patch Transdermal every 24 hours  losartan 50 milliGRAM(s) Oral daily  pantoprazole    Tablet 40 milliGRAM(s) Oral before breakfast    MEDICATIONS  (PRN):  acetaminophen     Tablet .. 650 milliGRAM(s) Oral every 6 hours PRN Mild Pain (1 - 3)  ibuprofen  Tablet. 400 milliGRAM(s) Oral three times a day PRN Mild Pain (1 - 3)  traMADol 50 milliGRAM(s) Oral every 6 hours PRN Moderate Pain (4 - 6)      LABS:                        12.4   6.99  )-----------( 121      ( 31 Oct 2024 07:41 )             37.4     10-31    142  |  105  |  22[H]  ----------------------------<  88  4.3   |  24  |  0.9    Ca    9.0      31 Oct 2024 07:41    TPro  6.6  /  Alb  4.4  /  TBili  1.2  /  DBili  x   /  AST  22  /  ALT  15  /  AlkPhos  90  10-30                RADIOLOGY & ADDITIONAL TESTS:    Imaging or report Personally Reviewed:  [x ] YES  [ ] NO    < from: Xray Chest 1 View- PORTABLE-Urgent (10.30.24 @ 08:13) >    Impression:      No acute pulmonary process    < end of copied text >      < from: CT Angio Abdomen and Pelvis w/ IV Cont (10.30.24 @ 07:59) >  IMPRESSION:  No evidence of aortic dissection. Atherosclerotic changes of the aorta   and its branches.    Scattered areas nodularity within the right lung. Follow-up according to   Fleischner Society guidelines.    Chronic/degenerative changes as described above.      < end of copied text >    Case discussed on rounds with med residents and RN and with pt

## 2024-10-31 NOTE — PROGRESS NOTE ADULT - SUBJECTIVE AND OBJECTIVE BOX
SUBJECTIVE/OVERNIGHT EVENTS  Today is hospital day 1d. This morning patient was seen and examined at bedside, resting comfortably in bed. No acute or major events overnight. Patient continues to endorse dull chest pain with inspiration. She denies any SOB, nausea, vomiting, headaches, abdominal pain. Patient endorses normal BMs.      Shortness of breath    Handoff    MEWS Score    HTN (hypertension)    Hypercholesteremia    Arthritis    Cancer, colon    Skin cancer    Deep vein thrombosis (DVT)    Shortness of breath    History of ventral hernia repair    History of knee replacement, total, left    History of colon resection    H/O umbilical hernia repair    History of incisional hernia repair    Loose right total knee arthroplasty    H/O total knee replacement, bilateral    CHEST PAIN RIGHT ARM PAIN AND DIFF BREATHING    90+    Fever    Chest pain    SysAdmin_VstLnk        MEDICATIONS  STANDING MEDICATIONS  donepezil 5 milliGRAM(s) Oral at bedtime  enoxaparin Injectable 40 milliGRAM(s) SubCutaneous every 24 hours  hydrochlorothiazide 12.5 milliGRAM(s) Oral daily  lidocaine   4% Patch 2 Patch Transdermal every 24 hours  losartan 50 milliGRAM(s) Oral daily  pantoprazole    Tablet 40 milliGRAM(s) Oral before breakfast    PRN MEDICATIONS  acetaminophen     Tablet .. 650 milliGRAM(s) Oral every 6 hours PRN  ibuprofen  Tablet. 400 milliGRAM(s) Oral three times a day PRN  traMADol 50 milliGRAM(s) Oral every 6 hours PRN    VITALS  T(F): 98.2 (10-31-24 @ 12:40), Max: 98.8 (10-30-24 @ 21:15)  HR: 68 (10-31-24 @ 12:40) (64 - 76)  BP: 116/75 (10-31-24 @ 12:40) (110/70 - 125/80)  RR: 18 (10-31-24 @ 12:40) (18 - 18)  SpO2: 95% (10-31-24 @ 12:40) (95% - 98%)    PHYSICAL EXAM  GENERAL  ( x ) NAD, lying in bed comfortably     (  ) obtunded     (  ) lethargic     (  ) somnolent    HEART  Rate -->  (x  ) normal rate    (  ) bradycardic    (  ) tachycardic  Rhythm -->  (  ) regular    (  ) regularly irregular    (  ) irregularly irregular  Murmurs -->  (  ) normal s1/s2    (  ) systolic murmur    (  ) diastolic murmur    (  ) continuous murmur     (  ) S3 present    (  ) S4 present    LUNGS  ( x )Unlabored respirations     (  ) tachypnea  (  ) B/L air entry     (  ) decreased breath sounds in:  (location     )    (  ) no adventitious sound     (  ) crackles     (  ) wheezing      (  ) rhonchi      (specify location:       )  (  ) chest wall tenderness (specify location:       )    ABDOMEN  ( x ) Soft     (  ) tense   |   ( x ) nondistended     (  ) distended   |   (  ) +BS     (  ) hypoactive bowel sounds     (  ) hyperactive bowel sounds  ( x ) nontender     (  ) RUQ tenderness     (  ) RLQ tenderness     (  ) LLQ tenderness     (  ) epigastric tenderness     (  ) diffuse tenderness  (  ) Splenomegaly      (  ) Hepatomegaly      (  ) Jaundice     (  ) ecchymosis     NERVOUS SYSTEM  ( x ) A&Ox3     (  ) confused     (  ) lethargic  CN II-XII:     (  ) Intact     (  ) focal deficits  (Specify:     )   Upper extremities:     (  ) strength X/5     (  ) focal deficit (specify:    )  Lower extremities:     (  ) strength  X/5    (  ) focal deficit (specify:    )      LABS             12.4   6.99  )-----------( 121      ( 10-31-24 @ 07:41 )             37.4     142  |  105  |  22  -------------------------<  88   10-31-24 @ 07:41  4.3  |  24  |  0.9    Ca      9.0     10-31-24 @ 07:41    TPro  6.6  /  Alb  4.4  /  TBili  1.2  /  DBili  x   /  AST  22  /  ALT  15  /  AlkPhos  90  /  GGT  x     10-30-24 @ 06:50      Troponin T, High Sensitivity Result: 18 ng/L (10-30-24 @ 22:27)  Troponin T, High Sensitivity Result: 15 ng/L (10-30-24 @ 10:00)  Troponin T, High Sensitivity Result: 15 ng/L (10-30-24 @ 06:50)    Urinalysis Basic - ( 31 Oct 2024 07:41 )    Color: x / Appearance: x / SG: x / pH: x  Gluc: 88 mg/dL / Ketone: x  / Bili: x / Urobili: x   Blood: x / Protein: x / Nitrite: x   Leuk Esterase: x / RBC: x / WBC x   Sq Epi: x / Non Sq Epi: x / Bacteria: x          IMAGING

## 2024-10-31 NOTE — PROGRESS NOTE ADULT - ASSESSMENT
80 y/o woman with PMH of colon cancer in 2002, HTN and hyperlipidemia presented with right sided pleuritic chest and abdominal pain after lying on her right side at home. She also had SOB. Pulse ox 94% on RA in the ED.     1. Acute onset of right sided Pleuritic chest pain and abdominal pain  based on history and workup so far, likely musculoskeletal in origin   agree with continuing treatment for costochondritis with lidoderm, tylenol and NSAIDS - pt improving  no hypoxia - check pulse ox on RA after ambulation  EKG reviewed by me - NSR  ESR 25 and CRP 9.25 (no significant elevation)  troponins 15 x2 -> 18  No MI and doubt pericarditis  proBNP elevated and CT scan with cardiomegaly but no signs of volume overload - doubt CHF  CT chest reviewed other than multiple nodules, no signs of pericardial effusion or pleural effusion.  For pulmonary nodules - needs outpatient f/u -  given that the nodules are less than 6mm can repeat imaging in 12 months (ex smoker)    2. HTN - continue current management    3. OOB and ambulate    full code      PROGRESS NOTE HANDOFF    Pending: continued improvement in pain, pulse ox on RA after ambulation  pt informed of the plan of care  Disposition: home in 24hrs if continues to improve

## 2024-11-01 ENCOUNTER — TRANSCRIPTION ENCOUNTER (OUTPATIENT)
Age: 79
End: 2024-11-01

## 2024-11-01 VITALS
DIASTOLIC BLOOD PRESSURE: 89 MMHG | TEMPERATURE: 98 F | SYSTOLIC BLOOD PRESSURE: 138 MMHG | RESPIRATION RATE: 18 BRPM | HEART RATE: 70 BPM

## 2024-11-01 LAB
DSDNA AB SER-ACNC: 1 IU/ML — SIGNIFICANT CHANGE UP
RHEUMATOID FACT SERPL-ACNC: 18 IU/ML — HIGH (ref 0–13)

## 2024-11-01 PROCEDURE — 99238 HOSP IP/OBS DSCHRG MGMT 30/<: CPT

## 2024-11-01 RX ORDER — ACETAMINOPHEN 500 MG
2 TABLET ORAL
Qty: 0 | Refills: 0 | DISCHARGE
Start: 2024-11-01

## 2024-11-01 RX ADMIN — Medication 40 MILLIGRAM(S): at 13:09

## 2024-11-01 RX ADMIN — LOSARTAN POTASSIUM 50 MILLIGRAM(S): 25 TABLET ORAL at 05:43

## 2024-11-01 RX ADMIN — PANTOPRAZOLE SODIUM 40 MILLIGRAM(S): 40 TABLET, DELAYED RELEASE ORAL at 05:43

## 2024-11-01 NOTE — DISCHARGE NOTE NURSING/CASE MANAGEMENT/SOCIAL WORK - FINANCIAL ASSISTANCE
[facility] provides services at a reduced cost to those who are determined to be eligible through [facility]’s financial assistance program. Information regarding [facility]’s financial assistance program can be found by going to [email or web address] or by calling [phone].

## 2024-11-01 NOTE — CHART NOTE - NSCHARTNOTEFT_GEN_A_CORE
Consult received for "MST Score = />2." Upon chart review, patient with stable weight, does not currently meet criteria for Protein Calorie Malnutrition risk per MST. RD remains available for assessment per protocol or as needed.    Pt reports good PO/appetite PTA, consumes 2-3 meals/day. UBW reported: 77.2kg vs. wt at admit: 77.1kg -- no change. NKFA. Unsure of what MVI she takes.     At admit, consuming >75% of all meals/ No chewing/swallowing difficulty reported. NO GI discomfort, last BM 10/31.     low risk f/u   RD remains available: Elaina Jack, RDN [phone]

## 2024-11-01 NOTE — DISCHARGE NOTE PROVIDER - CARE PROVIDER_API CALL
Ajit Trammell  Internal Medicine  [address] [place] [address]  Phone: [phone]  Fax: [phone]  Follow Up Time: 2 weeks

## 2024-11-01 NOTE — DISCHARGE NOTE PROVIDER - NSDCCPCAREPLAN_GEN_ALL_CORE_FT
PRINCIPAL DISCHARGE DIAGNOSIS  Diagnosis: Pleuritic chest pain  Assessment and Plan of Treatment: You came to the ED for chest pain while taking a deep breath. We did imaging that was not revealing. We then marisabel blood for markers of the heart that was negative. EKG was also unrevealing. Please follow up with your primary doctor in two weeks,     PRINCIPAL DISCHARGE DIAGNOSIS  Diagnosis: Pleuritic chest pain  Assessment and Plan of Treatment: You came to the ED for chest pain while taking a deep breath. We did imaging that was not revealing. We then marisabel blood for markers of the heart that was negative. EKG was also unrevealing. Please follow up with your primary doctor in 1 -two weeks.  You need repeat CT scan of chest in 12 months to evalute for nodularity in the right lung (your doctor can order this test).

## 2024-11-01 NOTE — PROGRESS NOTE ADULT - SUBJECTIVE AND OBJECTIVE BOX
PANKAJ THOMAS  79y Female    INTERVAL HPI/OVERNIGHT EVENTS:      pt feels better  able to take deeper breaths now  I ambulated with the pt and pulse ox was 94-95% the entire time and she never desaturated  no pain today  no fever, cough, SOB  comfortable in going home today    T(F): 98.3 (11-01-24 @ 05:43), Max: 98.5 (10-31-24 @ 21:16)  HR: 70 (11-01-24 @ 05:43) (68 - 70)  BP: 132/80 (11-01-24 @ 05:43) (116/75 - 132/84)  RR: 18 (11-01-24 @ 05:43) (18 - 18)  SpO2: 97% (10-31-24 @ 21:16) (95% - 97%) on RA    PHYSICAL EXAM:  GENERAL: NAD  HEAD:  Normocephalic  EYES:  conjunctiva and sclera clear  ENMT: Moist mucous membranes  NERVOUS SYSTEM:  Alert, awake, Good concentration  CHEST/LUNG: CTA b/l  HEART: Regular rate and rhythm  ABDOMEN: Soft, Nontender, Nondistended  EXTREMITIES:   No edema  SKIN: warm, dry    Consultant(s) Notes Reviewed:  [x ] YES  [ ] NO  Care Discussed with Consultants/Other Providers [ x] YES  [ ] NO    MEDICATIONS  (STANDING):  donepezil 5 milliGRAM(s) Oral at bedtime  enoxaparin Injectable 40 milliGRAM(s) SubCutaneous every 24 hours  hydrochlorothiazide 12.5 milliGRAM(s) Oral daily  lidocaine   4% Patch 2 Patch Transdermal every 24 hours  losartan 50 milliGRAM(s) Oral daily  pantoprazole    Tablet 40 milliGRAM(s) Oral before breakfast    MEDICATIONS  (PRN):  acetaminophen     Tablet .. 650 milliGRAM(s) Oral every 6 hours PRN Mild Pain (1 - 3)  ibuprofen  Tablet. 400 milliGRAM(s) Oral three times a day PRN Mild Pain (1 - 3)  traMADol 50 milliGRAM(s) Oral every 6 hours PRN Moderate Pain (4 - 6)      LABS:                        12.4   6.99  )-----------( 121      ( 31 Oct 2024 07:41 )             37.4     10-31    143  |  105  |  24[H]  ----------------------------<  147[H]  3.8   |  24  |  1.0    Ca    9.6      31 Oct 2024 18:08          Culture - Blood (collected 30 Oct 2024 06:50)  Source: .Blood BLOOD  Preliminary Report (31 Oct 2024 14:02):    No growth at 24 hours    Culture - Blood (collected 30 Oct 2024 06:50)  Source: .Blood BLOOD  Preliminary Report (31 Oct 2024 14:02):    No growth at 24 hours                  Case discussed with residents and RN on rounds today    Care discussed with pt

## 2024-11-01 NOTE — DISCHARGE NOTE NURSING/CASE MANAGEMENT/SOCIAL WORK - PATIENT PORTAL LINK FT
You can access the FollowMyHealth Patient Portal offered by [facility] by registering at the following website: [email or web address]. By joining [facility]’s FollowMyHealth portal, you will also be able to view your health information using other applications (apps) compatible with our system.

## 2024-11-01 NOTE — DISCHARGE NOTE PROVIDER - NSDCMRMEDTOKEN_GEN_ALL_CORE_FT
acetaminophen 325 mg oral tablet: 2 tab(s) orally every 6 hours As needed Mild Pain (1 - 3)  donepezil 5 mg oral tablet: 1 tab(s) orally once a day  hydroCHLOROthiazide 12.5 mg oral tablet: 1 tab(s) orally once a day  losartan 50 mg oral tablet: 1 tab(s) orally once a day

## 2024-11-01 NOTE — DISCHARGE NOTE PROVIDER - HOSPITAL COURSE
History of Present Illness:   80 yo F w pmh HTN, HLD is presenting today for evaluation of CP.  Of note pt started experiencing midsternal cp yesterday night, says she slept weird on the couch and attributes it to it. Says overnight the cp got worse, eventually developed sob and this prompted her to come to the ED.   says she does not sleep a lot, hence yawns a lot, and the act causes pain. The chest is mre on deep inhalation and is preventing her from taking deep breaths.  Denies any prior episodes like this, denies fever or cough at home.    In ED, Sat 98 on 2L at encounter, one episode of fever to 101.9  trops 15 => 15  bnp 837    CTA CAP:  No evidence of aortic dissection. Atherosclerotic changes of the aorta   and its branches.    Scattered areas nodularity within the right lung. Follow-up according to   Fleischner Society guidelines.    Chronic/degenerative changes       Assessment    80 yo F w pmh HTN, HLD is presented to the ED for pleuritic chest pain. Imaging performed and significant for scattered areas of nodularity within the right lung. Likely 2/2 to MSK causes.      #costochondritis  #Muscle strain  Patient came to the ED endorsing pleuritic chest pain. She was given a dose of leveofloxacin and blood cultures were drawn. CTA chest and CTA abdomen and pelvis was performed and revealed scattered areas nodularity within the lung. Troponins were then drawn and was 15 and 18. EKG was performed and was NSR. Patients overall status has improved and is HDS for discharge.  -CTA CAP evident for Scattered areas nodularity within the right lung  -isolated episode of fever  -does not report cough or fever at home  -trop 15>18  - ESR 25/ CRP 9.5  -procal wnl  - was given on dose of in ED levofloxacin  - lido patch + toradol 15 q6 sev pain + tramadol 50 q6 mod pain + tylenol mild pain  -BCx specimen received      Discussion of discharge plan of care, including discharge diagnosis, medication reconciliation, and follow-ups, was conducted with Dr. Zhao and discharge was approved on 11/01/2024. History of Present Illness:   80 yo F w pmh HTN, HLD is presenting today for evaluation of CP.  Of note pt started experiencing midsternal cp yesterday night, says she slept weird on the couch and attributes it to it. Says overnight the cp got worse, eventually developed sob and this prompted her to come to the ED.   says she does not sleep a lot, hence yawns a lot, and the act causes pain. The chest is more on deep inhalation and is preventing her from taking deep breaths.  Denies any prior episodes like this, denies fever or cough at home.    In ED, Sat 98 on 2L at encounter, one episode of fever to 101.9  trops 15 => 15  bnp 837    CTA CAP:  No evidence of aortic dissection. Atherosclerotic changes of the aorta   and its branches.    Scattered areas nodularity within the right lung. Follow-up according to   Fleischner Society guidelines.    Chronic/degenerative changes       Assessment    80 yo F w pmh HTN, HLD is presented to the ED for pleuritic chest pain. Imaging performed and significant for scattered areas of nodularity within the right lung. Likely 2/2 to MSK causes.      #costochondritis  #Muscle strain  Patient came to the ED endorsing pleuritic chest pain. She was given a dose of leveofloxacin and blood cultures were drawn. CTA chest and CTA abdomen and pelvis was performed and revealed scattered areas nodularity within the lung. Troponins were then drawn and was 15 and 18. EKG was performed and was NSR. Patients overall status has improved and is HDS for discharge.  -CTA CAP evident for Scattered areas nodularity within the right lung  -isolated episode of fever  -does not report cough or fever at home  -trop 15>18  - ESR 25/ CRP 9.5  -procal wnl  - was given on dose of in ED levofloxacin  - lido patch + toradol 15 q6 sev pain + tramadol 50 q6 mod pain + tylenol mild pain  -BCx specimen received      Discussion of discharge plan of care, including discharge diagnosis, medication reconciliation, and follow-ups, was conducted with Dr. Zhao and discharge was approved on 11/01/2024.

## 2024-11-01 NOTE — PROGRESS NOTE ADULT - ASSESSMENT
80 y/o woman with PMH of colon cancer in 2002, HTN and hyperlipidemia presented with right sided pleuritic chest and abdominal pain after lying on her right side at home. She also had SOB. Pulse ox 94% on RA in the ED.     1. Acute onset of right sided Pleuritic chest pain and abdominal pain  based on history and workup so far, likely musculoskeletal in origin   pt improved on treatment for costochondritis with lidoderm, tylenol and NSAIDS   no hypoxia after ambulation  EKG reviewed by me - NSR  ESR 25 and CRP 9.25 (no significant elevation)  troponins 15 x2 -> 18  No MI and doubt pericarditis  proBNP elevated and CT scan with cardiomegaly but no signs of volume overload - doubt CHF  CT chest reviewed other than multiple nodules, no signs of pericardial effusion or pleural effusion.  For pulmonary nodules - needs outpatient f/u -  given that the nodules are less than 6mm can repeat imaging in 12 months (ex smoker)    2. HTN - continue current management    3. OOB and ambulate    full code      med rec and discharge papers reviewed by me

## 2024-11-04 LAB
ANA PAT FLD IF-IMP: ABNORMAL
ANA TITR SER: ABNORMAL
CULTURE RESULTS: SIGNIFICANT CHANGE UP
CULTURE RESULTS: SIGNIFICANT CHANGE UP
SPECIMEN SOURCE: SIGNIFICANT CHANGE UP
SPECIMEN SOURCE: SIGNIFICANT CHANGE UP

## 2024-11-11 DIAGNOSIS — R07.9 CHEST PAIN, UNSPECIFIED: ICD-10-CM

## 2024-11-11 DIAGNOSIS — I10 ESSENTIAL (PRIMARY) HYPERTENSION: ICD-10-CM

## 2024-11-11 DIAGNOSIS — Z85.828 PERSONAL HISTORY OF OTHER MALIGNANT NEOPLASM OF SKIN: ICD-10-CM

## 2024-11-11 DIAGNOSIS — Z88.0 ALLERGY STATUS TO PENICILLIN: ICD-10-CM

## 2024-11-11 DIAGNOSIS — R91.8 OTHER NONSPECIFIC ABNORMAL FINDING OF LUNG FIELD: ICD-10-CM

## 2024-11-11 DIAGNOSIS — Z96.653 PRESENCE OF ARTIFICIAL KNEE JOINT, BILATERAL: ICD-10-CM

## 2024-11-11 DIAGNOSIS — S29.011A STRAIN OF MUSCLE AND TENDON OF FRONT WALL OF THORAX, INITIAL ENCOUNTER: ICD-10-CM

## 2024-11-11 DIAGNOSIS — Z87.891 PERSONAL HISTORY OF NICOTINE DEPENDENCE: ICD-10-CM
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
## 2024-11-11 DIAGNOSIS — Z85.038 PERSONAL HISTORY OF OTHER MALIGNANT NEOPLASM OF LARGE INTESTINE: ICD-10-CM

## 2024-11-11 DIAGNOSIS — Z11.52 ENCOUNTER FOR SCREENING FOR COVID-19: ICD-10-CM

## 2024-11-11 DIAGNOSIS — Z86.718 PERSONAL HISTORY OF OTHER VENOUS THROMBOSIS AND EMBOLISM: ICD-10-CM
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
## 2024-11-11 DIAGNOSIS — Z79.899 OTHER LONG TERM (CURRENT) DRUG THERAPY: ICD-10-CM

## 2024-11-11 DIAGNOSIS — M94.0 CHONDROCOSTAL JUNCTION SYNDROME [TIETZE]: ICD-10-CM

## 2024-11-11 DIAGNOSIS — E78.00 PURE HYPERCHOLESTEROLEMIA, UNSPECIFIED: ICD-10-CM

## 2025-04-13 NOTE — ED PROVIDER NOTE - DATE/TIME 1
Scribe Attestation (For Scribes USE Only)... 30-Oct-2024 07:23 Attending Attestation (For Attendings USE Only).../Scribe Attestation (For Scribes USE Only)...

## 2025-08-09 ENCOUNTER — NON-APPOINTMENT (OUTPATIENT)
Age: 80
End: 2025-08-09